# Patient Record
Sex: FEMALE | Race: WHITE | NOT HISPANIC OR LATINO | Employment: OTHER | URBAN - METROPOLITAN AREA
[De-identification: names, ages, dates, MRNs, and addresses within clinical notes are randomized per-mention and may not be internally consistent; named-entity substitution may affect disease eponyms.]

---

## 2017-03-28 ENCOUNTER — OFFICE VISIT (OUTPATIENT)
Dept: URGENT CARE | Facility: URGENT CARE | Age: 67
End: 2017-03-28
Payer: COMMERCIAL

## 2017-03-28 ENCOUNTER — RADIANT APPOINTMENT (OUTPATIENT)
Dept: GENERAL RADIOLOGY | Facility: CLINIC | Age: 67
End: 2017-03-28
Attending: PHYSICIAN ASSISTANT
Payer: COMMERCIAL

## 2017-03-28 VITALS
OXYGEN SATURATION: 96 % | DIASTOLIC BLOOD PRESSURE: 90 MMHG | BODY MASS INDEX: 46.64 KG/M2 | WEIGHT: 263.2 LBS | SYSTOLIC BLOOD PRESSURE: 162 MMHG | HEIGHT: 63 IN | TEMPERATURE: 97.8 F | HEART RATE: 81 BPM

## 2017-03-28 DIAGNOSIS — M62.838 TRAPEZIUS MUSCLE SPASM: ICD-10-CM

## 2017-03-28 DIAGNOSIS — M54.2 CERVICALGIA: ICD-10-CM

## 2017-03-28 DIAGNOSIS — I10 ESSENTIAL HYPERTENSION: ICD-10-CM

## 2017-03-28 PROCEDURE — 99214 OFFICE O/P EST MOD 30 MIN: CPT | Performed by: PHYSICIAN ASSISTANT

## 2017-03-28 PROCEDURE — 72040 X-RAY EXAM NECK SPINE 2-3 VW: CPT

## 2017-03-28 RX ORDER — LISINOPRIL 20 MG/1
20 TABLET ORAL DAILY
Qty: 30 TABLET | Refills: 1 | Status: SHIPPED | OUTPATIENT
Start: 2017-03-28 | End: 2017-04-19

## 2017-03-28 RX ORDER — HYDROCODONE BITARTRATE AND ACETAMINOPHEN 5; 325 MG/1; MG/1
1-2 TABLET ORAL
Qty: 14 TABLET | Refills: 0 | Status: SHIPPED | OUTPATIENT
Start: 2017-03-28 | End: 2017-04-19

## 2017-03-28 RX ORDER — PREDNISONE 2.5 MG/1
2.5 TABLET ORAL DAILY
COMMUNITY
End: 2018-02-06

## 2017-03-28 RX ORDER — IBUPROFEN 200 MG
200 TABLET ORAL EVERY 4 HOURS PRN
COMMUNITY

## 2017-03-28 RX ORDER — METHOCARBAMOL 750 MG/1
750 TABLET, FILM COATED ORAL 3 TIMES DAILY PRN
Qty: 30 TABLET | Refills: 0 | Status: SHIPPED | OUTPATIENT
Start: 2017-03-28 | End: 2017-04-19

## 2017-03-28 RX ORDER — AMOXICILLIN 500 MG
CAPSULE ORAL DAILY
COMMUNITY
End: 2024-06-20

## 2017-03-28 NOTE — MR AVS SNAPSHOT
"              After Visit Summary   3/28/2017    Josefina Maldonado    MRN: [id number]           Patient Information     Date Of Birth          1950        Visit Information        Provider Department      3/28/2017 10:15 AM Shakir Crespo PA-C [facility]        Today's Diagnoses     Elevated systolic blood pressure    -  1    Cervicalgia        Trapezius muscle spasm        Essential hypertension           Follow-ups after your visit        Who to contact     If you have questions or need follow up information about today's clinic visit or your schedule please contact [facility] directly at [phone].  Normal or non-critical lab and imaging results will be communicated to you by [employer], letter or phone within 4 business days after the clinic has received the results. If you do not hear from us within 7 days, please contact the clinic through [employer] or phone. If you have a critical or abnormal lab result, we will notify you by phone as soon as possible.  Submit refill requests through [employer] or call your pharmacy and they will forward the refill request to us. Please allow 3 business days for your refill to be completed.          Additional Information About Your Visit        MyChart Information     [employer] lets you send messages to your doctor, view your test results, renew your prescriptions, schedule appointments and more. To sign up, go to [email or web address] . Click on \"Log in\" on the left side of the screen, which will take you to the Welcome page. Then click on \"Sign up Now\" on the right side of the page.     You will be asked to enter the access code listed below, as well as some personal information. Please follow the directions to create your username and password.     Your access code is: [email or web address]  Expires: 2017 12:22 PM     Your access code will  in 90 days. If you need help or a new code, please call your [place] " "clinic or [phone].        Care EveryWhere ID     This is your Care EveryWhere ID. This could be used by other organizations to access your [place] medical records  [id number]        Your Vitals Were     Pulse Temperature Height Last Period Pulse Oximetry BMI (Body Mass Index)    81 97.8  F (36.6  C) 5' 3\" (1.6 m) 04/11/2000 96% 46.62 kg/m2       Blood Pressure from Last 3 Encounters:   03/28/17 162/90   12/13/11 110/68   06/30/11 98/62    Weight from Last 3 Encounters:   03/28/17 263 lb 3.2 oz (119.4 kg)   12/13/11 257 lb 6.4 oz (116.8 kg)   06/30/11 259 lb 9.6 oz (117.8 kg)                 Today's Medication Changes          These changes are accurate as of: 3/28/17 12:22 PM.  If you have any questions, ask your nurse or doctor.               Start taking these medicines.        Dose/Directions    HYDROcodone-acetaminophen 5-325 MG per tablet   Commonly known as:  NORCO   Used for:  Cervicalgia, Trapezius muscle spasm   Started by:  Shakir Crespo PA-C        Dose:  1-2 tablet   Take 1-2 tablets by mouth nightly as needed   Quantity:  14 tablet   Refills:  0       methocarbamol 750 MG tablet   Commonly known as:  ROBAXIN   Used for:  Trapezius muscle spasm   Started by:  Shakir Crespo PA-C        Dose:  750 mg   Take 1 tablet (750 mg) by mouth 3 times daily as needed for muscle spasms   Quantity:  30 tablet   Refills:  0         These medicines have changed or have updated prescriptions.        Dose/Directions    predniSONE 2.5 MG tablet   Commonly known as:  DELTASONE   This may have changed:  Another medication with the same name was removed. Continue taking this medication, and follow the directions you see here.   Changed by:  Shakir Crespo PA-C        Dose:  2.5 mg   Take 2.5 mg by mouth daily   Refills:  0            Where to get your medicines      These medications were sent to [facility] [address] [place] [address]  [address] [facility] [address]     " Phone:  [phone]     lisinopril 20 MG tablet    methocarbamol 750 MG tablet         Some of these will need a paper prescription and others can be bought over the counter.  Ask your nurse if you have questions.     Bring a paper prescription for each of these medications     HYDROcodone-acetaminophen 5-325 MG per tablet                Primary Care Provider Office Phone # Fax #    Roshan Troy [phone]       [facility] [address] [facility] [address]        Thank you!     Thank you for choosing [facility]  for your care. Our goal is always to provide you with excellent care. Hearing back from our patients is one way we can continue to improve our services. Please take a few minutes to complete the written survey that you may receive in the mail after your visit with us. Thank you!             Your Updated Medication List - Protect others around you: Learn how to safely use, store and throw away your medicines at www.disposemymeds.org.          This list is accurate as of: 3/28/17 12:22 PM.  Always use your most recent med list.                   Brand Name Dispense Instructions for use    5-HTP PO      Reported on 3/28/2017       ASPIRIN PO      Take 81 mg by mouth       calcium carbonate-Vit D-Min 600-400 MG-UNIT per tablet     100 tablet    Take 1 tablet by mouth daily. FOR BONE HEALTH       FISH OIL PO      Take  by mouth.       HYDROcodone-acetaminophen 5-325 MG per tablet    NORCO    14 tablet    Take 1-2 tablets by mouth nightly as needed       ibuprofen 200 MG tablet    ADVIL/MOTRIN     Take 200 mg by mouth every 4 hours as needed for mild pain       L-Tryptophan 500 MG Caps      Take by mouth daily       lisinopril 20 MG tablet    PRINIVIL    30 tablet    Take 1 tablet (20 mg) by mouth daily FOR HIGH BLOOD PRESSURE       MELATONIN PO      Take  by mouth.       methocarbamol 750 MG tablet    ROBAXIN    30 tablet    Take 1 tablet (750  mg) by mouth 3 times daily as needed for muscle spasms       MULTIVITAMIN PO      None Entered       OTEZLA PO      Take by mouth daily       predniSONE 2.5 MG tablet    DELTASONE     Take 2.5 mg by mouth daily       VITAMIN B-1 PO      Take  by mouth.       VITAMIN C PO      Take  by mouth.       VITAMIN E PO      Take  by mouth.

## 2017-03-28 NOTE — NURSING NOTE
"Chief Complaint   Patient presents with     Urgent Care     Pt slipped and fell on ice about mid February. Pt went to [facility]. Pt reports no broken bones. Pt had an MRI of her right shoulder which was clear. Pt went back to [facility] and they gave her a brace and sent her to PT. Pt id frustrated because now she is having neck pain na d her BP is has been high.       Initial /90  Pulse 81  Temp 97.8  F (36.6  C)  Ht 5' 3\" (1.6 m)  Wt 263 lb 3.2 oz (119.4 kg)  LMP 04/11/2000  SpO2 96%  BMI 46.62 kg/m2 Estimated body mass index is 46.62 kg/(m^2) as calculated from the following:    Height as of this encounter: 5' 3\" (1.6 m).    Weight as of this encounter: 263 lb 3.2 oz (119.4 kg).  Medication Reconciliation: complete    "

## 2017-03-28 NOTE — PROGRESS NOTES
SUBJECTIVE:  Chief Complaint   Patient presents with     Urgent Care     Pt slipped and fell on ice about mid February. Pt went to [facility]. Pt reports no broken bones. Pt had an MRI of her right shoulder which was clear. Pt went back to [facility] and they gave her a brace and sent her to PT. Pt id frustrated because now she is having neck pain na d her BP is has been high.     Josefina Maldonado is a 66 year old female presents with a chief complaint of upper back, tenderness, spasms and tightness.  The injury occurred weeks  ago.   How: lifting, twisted, turning/bendingimmediate pain.  The patient complained of moderate pain  and has had decreased ROM.  Pain exacerbated by movement.  Relieved by nothing.  She treated it initially with ice and heat. This is not the first time this type of injury has occurred to this patient.     Past Medical History:   Diagnosis Date     Chronic depressive personality disorder 1966    has a therapist     Degenerative disc disease      Glucose intolerance      Hypersomnia with sleep apnea, unspecified 2001    uses cpap     Hypertension      Obesity      Psoriatic arthritis (H)      Rheumatic fever without mention of heart involvement 1950     Allergies   Allergen Reactions     Latex      rash     Social History   Substance Use Topics     Smoking status: Current Every Day Smoker     Packs/day: 1.00     Years: 40.00     Smokeless tobacco: Not on file      Comment: 5-6 cigarettes per day     Alcohol use No       ROS:  CONSTITUTIONAL:NEGATIVE for fever, chills, change in weight  INTEGUMENTARY/SKIN: NEGATIVE for worrisome rashes, moles or lesions  ENT/MOUTH: NEGATIVE for ear, mouth and throat problems  RESP:NEGATIVE for significant cough or SOB  CV: NEGATIVE for chest pain, palpitations or peripheral edema  GI: NEGATIVE for nausea, abdominal pain, heartburn, or change in bowel habits  MUSCULOSKELETAL: POSITIVE  for upper back pain, spasms and DROM  NEURO: NEGATIVE for weakness, dizziness or  "paresthesias    EXAM:   /90  Pulse 81  Temp 97.8  F (36.6  C)  Ht 5' 3\" (1.6 m)  Wt 263 lb 3.2 oz (119.4 kg)  LMP 04/11/2000  SpO2 96%  BMI 46.62 kg/m2  Gen: healthy,alert,no distress  Extremity: DROM of right upper arm.   There is not compromise to the distal circulation.  Pulses are +2 and CRT is brisk  GENERAL APPEARANCE: healthy, alert and no distress  NECK: supple, non-tender to palpation, FROM   CHEST: clear to auscultation  CV: regular rate and rhythm  EXTREMITIES: peripheral pulses normal  MS:  tender to palpation right side  SKIN: no suspicious lesions or rashes  NEURO: Normal strength and tone, sensory exam grossly normal, mentation intact and speech normal    X-RAY was done and negative for acute changes including fracture Xray read by Shakir Crespo at time of visit    ASSESSMENT/PLAN:      ICD-10-CM    1. Elevated systolic blood pressure I10    2. Cervicalgia M54.2 XR Cervical Spine 2/3 Views     HYDROcodone-acetaminophen (NORCO) 5-325 MG per tablet   3. Trapezius muscle spasm M62.838 HYDROcodone-acetaminophen (NORCO) 5-325 MG per tablet     methocarbamol (ROBAXIN) 750 MG tablet   4. Essential hypertension I10 lisinopril (PRINIVIL) 20 MG tablet       Follow up with PCP  May need PT    "

## 2017-03-28 NOTE — LETTER
[facility] [place] [facility]  [address] [id number] [phone]      March 28, 2017    RE:  Josefina Maldonado                                                                                                                                                       [address]  [facility] [address]            To whom it may concern:    Josefina Maldonado was seen in the urgent care today.        Sincerely,        Shakir Crespo [facility]

## 2017-03-30 ENCOUNTER — TELEPHONE (OUTPATIENT)
Dept: INTERNAL MEDICINE | Facility: CLINIC | Age: 67
End: 2017-03-30

## 2017-03-30 NOTE — TELEPHONE ENCOUNTER
Pt was started on lisinopril (PRINIVIL) 20 MG tablet for high blood pressure. Her last dose was today at 1:30pm (she took 20mg tablet).  Her BP readings have been:  174/102 , 167/98, and 162/95.   is wondering if pt should take another BP pill tonight to help bring down her BP.   Pt denies lightheadedness, dizziness, no chest pain, no SOB, no weakness.  Appt scheduled with Dr. Goodson tomorrow at 11am.  advised  to bring pt in sooner if she has any lightheadedness dizziness, chest pain SOB.  Patient stated understanding, and agreed to plan of care.

## 2017-03-30 NOTE — TELEPHONE ENCOUNTER
I don't necessarily think the pt needs to be seen soley based on some elevated BP #  I will defer to Dr. Troy in regards to medical mgmt and appropriate f/u.  She may want to fit the pt into her schedule as I would prefer to leave my same day spots open for truly acute same day needs

## 2017-03-31 ENCOUNTER — OFFICE VISIT (OUTPATIENT)
Dept: INTERNAL MEDICINE | Facility: CLINIC | Age: 67
End: 2017-03-31
Payer: COMMERCIAL

## 2017-03-31 VITALS
WEIGHT: 267.6 LBS | HEART RATE: 102 BPM | HEIGHT: 63 IN | BODY MASS INDEX: 47.41 KG/M2 | DIASTOLIC BLOOD PRESSURE: 90 MMHG | OXYGEN SATURATION: 96 % | SYSTOLIC BLOOD PRESSURE: 150 MMHG | TEMPERATURE: 98.1 F

## 2017-03-31 DIAGNOSIS — G47.10 HYPERSOMNIA WITH SLEEP APNEA: ICD-10-CM

## 2017-03-31 DIAGNOSIS — G47.30 HYPERSOMNIA WITH SLEEP APNEA: ICD-10-CM

## 2017-03-31 DIAGNOSIS — I10 ESSENTIAL HYPERTENSION: Primary | ICD-10-CM

## 2017-03-31 DIAGNOSIS — L40.50 PSORIATIC ARTHRITIS (H): ICD-10-CM

## 2017-03-31 LAB
ANION GAP SERPL CALCULATED.3IONS-SCNC: 7 MMOL/L (ref 3–14)
BUN SERPL-MCNC: 14 MG/DL (ref 7–30)
CALCIUM SERPL-MCNC: 9 MG/DL (ref 8.5–10.1)
CHLORIDE SERPL-SCNC: 105 MMOL/L (ref 94–109)
CO2 SERPL-SCNC: 29 MMOL/L (ref 20–32)
CREAT SERPL-MCNC: 0.62 MG/DL (ref 0.52–1.04)
GFR SERPL CREATININE-BSD FRML MDRD: ABNORMAL ML/MIN/1.7M2
GLUCOSE SERPL-MCNC: 177 MG/DL (ref 70–99)
POTASSIUM SERPL-SCNC: 3.9 MMOL/L (ref 3.4–5.3)
SODIUM SERPL-SCNC: 141 MMOL/L (ref 133–144)

## 2017-03-31 PROCEDURE — 36415 COLL VENOUS BLD VENIPUNCTURE: CPT | Performed by: INTERNAL MEDICINE

## 2017-03-31 PROCEDURE — 80048 BASIC METABOLIC PNL TOTAL CA: CPT | Performed by: INTERNAL MEDICINE

## 2017-03-31 PROCEDURE — 99214 OFFICE O/P EST MOD 30 MIN: CPT | Performed by: INTERNAL MEDICINE

## 2017-03-31 RX ORDER — HYDROCHLOROTHIAZIDE 12.5 MG/1
12.5 TABLET ORAL DAILY
Qty: 30 TABLET | Refills: 1 | Status: SHIPPED | OUTPATIENT
Start: 2017-03-31 | End: 2017-04-19

## 2017-03-31 NOTE — LETTER
[facility]  [address]  [address]  [phone]        Josefina Maldonado  [address]  [facility] [address]      4/5/2017        Dear Ms. Josefina Maldonado:    I am writing to inform you of the results of the laboratory tests you had done recently.  Results for orders placed or performed in visit on 03/31/17   Basic metabolic panel   Result Value Ref Range    Sodium 141 133 - 144 mmol/L    Potassium 3.9 3.4 - 5.3 mmol/L    Chloride 105 94 - 109 mmol/L    Carbon Dioxide 29 20 - 32 mmol/L    Anion Gap 7 3 - 14 mmol/L    Glucose 177 (H) 70 - 99 mg/dL    Urea Nitrogen 14 7 - 30 mg/dL    Creatinine 0.62 0.52 - 1.04 mg/dL    GFR Estimate >90  Non  GFR Calc   >60 mL/min/1.7m2    GFR Estimate If Black >90   GFR Calc   >60 mL/min/1.7m2    Calcium 9.0 8.5 - 10.1 mg/dL      The lab is non-fasting so the glucose is actually not abnormal.  It would be a good idea to get a fasting specimen though.  We can arrange this at your follow up.    Thank you for allowing me to participate in your care. If you have any further questions or problems, please contact me via our nurse line at [phone].    Sincerely,          Virgil Goodson MD  Department of Internal Medicine  [facility]

## 2017-03-31 NOTE — PROGRESS NOTES
"  SUBJECTIVE:                                                    Josefina Maldonado is a 66 year old female who presents to clinic today for the following health issues:    She was last seen here in 2011 but has regular f/u with her rheumatologist.  She became aware recently that her BP was higher than it had been previously for some time. Yrs ago she states she had taken anti-HTN meds but it was a \"long time ago\".    Earlier this week she was seen in the [facility] and started on 20 mg of lisinopril due to her elevations.    Hypertension Follow-up  BP Readings from Last 3 Encounters:   03/31/17 150/90   03/28/17 162/90   12/13/11 110/68       Outpatient blood pressures are being checked at home.  Results are 140-180s to 80s-110s.    Low Salt Diet: no added salt       Amount of exercise or physical activity: 1 day/week for an average of 45-60 minutes    Problems taking medications regularly: No    Medication side effects: none    Diet: regular (no restrictions)    Problem list and histories reviewed & adjusted, as indicated.  Additional history: as documented    Labs reviewed in EPIC    Reviewed and updated as needed this visit by clinical staff  Tobacco  Allergies       Reviewed and updated as needed this visit by Provider         ROS:  Constitutional, neuro, ENT, endocrine, pulmonary, cardiac, gastrointestinal, genitourinary, musculoskeletal, integument and psychiatric systems are negative, except as otherwise noted.    OBJECTIVE:                                                    /90 (BP Location: Left arm, Patient Position: Chair, Cuff Size: Adult Large)  Pulse 102  Temp 98.1  F (36.7  C) (Oral)  Ht 5' 3\" (1.6 m)  Wt 267 lb 9.6 oz (121.4 kg)  LMP 04/11/2000  SpO2 96%  BMI 47.4 kg/m2  Body mass index is 47.4 kg/(m^2).  GENERAL APPEARANCE: alert, no distress and over weight  HENT: nose and mouth without ulcers or lesions  NECK: no adenopathy, no asymmetry, masses, or scars and thyroid normal to " palpation  RESP: lungs clear to auscultation - no rales, rhonchi or wheezes  CV: regular rates and rhythm, normal S1 S2, no S3 or S4 and no murmur, click or rub  MS: extremities normal- no gross deformities noted  SKIN: no suspicious lesions or rashes  NEURO: Normal strength and tone, mentation intact and speech normal  PSYCH: mentation appears normal and affect normal/bright    Diagnostic test results:  Results for orders placed or performed in visit on 03/31/17 (from the past 24 hour(s))   Basic metabolic panel   Result Value Ref Range    Sodium 141 133 - 144 mmol/L    Potassium 3.9 3.4 - 5.3 mmol/L    Chloride 105 94 - 109 mmol/L    Carbon Dioxide 29 20 - 32 mmol/L    Anion Gap 7 3 - 14 mmol/L    Glucose 177 (H) 70 - 99 mg/dL    Urea Nitrogen 14 7 - 30 mg/dL    Creatinine 0.62 0.52 - 1.04 mg/dL    GFR Estimate >90  Non  GFR Calc   >60 mL/min/1.7m2    GFR Estimate If Black >90   GFR Calc   >60 mL/min/1.7m2    Calcium 9.0 8.5 - 10.1 mg/dL        ASSESSMENT/PLAN:                                                    1. Essential hypertension  Add hctz  F/u next 3-4 wks  - hydrochlorothiazide 12.5 MG TABS tablet; Take 1 tablet (12.5 mg) by mouth daily  Dispense: 30 tablet; Refill: 1  - Basic metabolic panel    2. Psoriatic arthritis (H)  Per rheum    3. Hypersomnia with sleep apnea  Could be contributing. Check with sleep specialist       Follow up with Provider - 3-4 wks     Virgil Goodson MD  [facility]

## 2017-03-31 NOTE — NURSING NOTE
"Chief Complaint   Patient presents with     Hypertension     recheck meds        Initial /90 (BP Location: Left arm, Patient Position: Chair, Cuff Size: Adult Large)  Pulse 102  Temp 98.1  F (36.7  C) (Oral)  Ht 5' 3\" (1.6 m)  Wt 267 lb 9.6 oz (121.4 kg)  LMP 04/11/2000  SpO2 96%  BMI 47.4 kg/m2 Estimated body mass index is 47.4 kg/(m^2) as calculated from the following:    Height as of this encounter: 5' 3\" (1.6 m).    Weight as of this encounter: 267 lb 9.6 oz (121.4 kg).  Medication Reconciliation: complete    "

## 2017-03-31 NOTE — MR AVS SNAPSHOT
"              After Visit Summary   3/31/2017    Josefina Maldonado    MRN: [id number]           Patient Information     Date Of Birth          1950        Visit Information        Provider Department      3/31/2017 11:00 AM Virgil Goodson MD [facility]        Today's Diagnoses     Essential hypertension    -  1       Follow-ups after your visit        Who to contact     If you have questions or need follow up information about today's clinic visit or your schedule please contact [facility] directly at [phone].  Normal or non-critical lab and imaging results will be communicated to you by [employer], letter or phone within 4 business days after the clinic has received the results. If you do not hear from us within 7 days, please contact the clinic through [employer] or phone. If you have a critical or abnormal lab result, we will notify you by phone as soon as possible.  Submit refill requests through [employer] or call your pharmacy and they will forward the refill request to us. Please allow 3 business days for your refill to be completed.          Additional Information About Your Visit        MyChart Information     [employer] lets you send messages to your doctor, view your test results, renew your prescriptions, schedule appointments and more. To sign up, go to [email or web address] . Click on \"Log in\" on the left side of the screen, which will take you to the Welcome page. Then click on \"Sign up Now\" on the right side of the page.     You will be asked to enter the access code listed below, as well as some personal information. Please follow the directions to create your username and password.     Your access code is: [email or web address]  Expires: 2017 12:22 PM     Your access code will  in 90 days. If you need help or a new code, please call your [facility] or [phone].        Care EveryWhere ID     This is your Care EveryWhere ID. This could " "be used by other organizations to access your [place] medical records  [id number]        Your Vitals Were     Pulse Temperature Height Last Period Pulse Oximetry BMI (Body Mass Index)    102 98.1  F (36.7  C) (Oral) 5' 3\" (1.6 m) 04/11/2000 96% 47.4 kg/m2       Blood Pressure from Last 3 Encounters:   03/31/17 150/90   03/28/17 162/90   12/13/11 110/68    Weight from Last 3 Encounters:   03/31/17 267 lb 9.6 oz (121.4 kg)   03/28/17 263 lb 3.2 oz (119.4 kg)   12/13/11 257 lb 6.4 oz (116.8 kg)              We Performed the Following     Basic metabolic panel          Today's Medication Changes          These changes are accurate as of: 3/31/17 11:31 AM.  If you have any questions, ask your nurse or doctor.               Start taking these medicines.        Dose/Directions    hydrochlorothiazide 12.5 MG Tabs tablet   Used for:  Essential hypertension   Started by:  Virgil Goodson MD        Dose:  12.5 mg   Take 1 tablet (12.5 mg) by mouth daily   Quantity:  30 tablet   Refills:  1            Where to get your medicines      These medications were sent to [facility] [address] [place] [address]     Phone:  [phone]     hydrochlorothiazide 12.5 MG Tabs tablet                Primary Care Provider Office Phone # Fax #    Carolcassie Troy [phone]       [facility] [address] [facility] [address]        Thank you!     Thank you for choosing [facility]  for your care. Our goal is always to provide you with excellent care. Hearing back from our patients is one way we can continue to improve our services. Please take a few minutes to complete the written survey that you may receive in the mail after your visit with us. Thank you!             Your Updated Medication List - Protect others around you: Learn how to safely use, store and throw away your medicines at www.disposemymeds.org. "          This list is accurate as of: 3/31/17 11:31 AM.  Always use your most recent med list.                   Brand Name Dispense Instructions for use    5-HTP PO      Reported on 3/31/2017       ASPIRIN PO      Take 81 mg by mouth       calcium carbonate-Vit D-Min 600-400 MG-UNIT per tablet     100 tablet    Take 1 tablet by mouth daily. FOR BONE HEALTH       FISH OIL PO      Take  by mouth.       hydrochlorothiazide 12.5 MG Tabs tablet     30 tablet    Take 1 tablet (12.5 mg) by mouth daily       HYDROcodone-acetaminophen 5-325 MG per tablet    NORCO    14 tablet    Take 1-2 tablets by mouth nightly as needed       ibuprofen 200 MG tablet    ADVIL/MOTRIN     Take 200 mg by mouth every 4 hours as needed for mild pain       L-Tryptophan 500 MG Caps      Take by mouth daily       lisinopril 20 MG tablet    PRINIVIL    30 tablet    Take 1 tablet (20 mg) by mouth daily FOR HIGH BLOOD PRESSURE       MELATONIN PO      Take  by mouth.       methocarbamol 750 MG tablet    ROBAXIN    30 tablet    Take 1 tablet (750 mg) by mouth 3 times daily as needed for muscle spasms       MULTIVITAMIN PO      None Entered       OTEZLA PO      Take by mouth daily       predniSONE 2.5 MG tablet    DELTASONE     Take 2.5 mg by mouth daily       VITAMIN B-1 PO      Take  by mouth.       VITAMIN C PO      Take  by mouth.       VITAMIN E PO      Take  by mouth.

## 2017-04-04 ENCOUNTER — TRANSFERRED RECORDS (OUTPATIENT)
Dept: HEALTH INFORMATION MANAGEMENT | Facility: CLINIC | Age: 67
End: 2017-04-04

## 2017-04-04 LAB
ALT SERPL-CCNC: 32 IU/L (ref 5–35)
AST SERPL-CCNC: 23 U/L (ref 5–34)
CREAT SERPL-MCNC: 0.77 MG/DL (ref 0.5–1.3)
GFR SERPL CREATININE-BSD FRML MDRD: 79.2 ML/MIN/1.73M2

## 2017-04-19 ENCOUNTER — OFFICE VISIT (OUTPATIENT)
Dept: INTERNAL MEDICINE | Facility: CLINIC | Age: 67
End: 2017-04-19
Payer: COMMERCIAL

## 2017-04-19 VITALS
BODY MASS INDEX: 46.38 KG/M2 | DIASTOLIC BLOOD PRESSURE: 72 MMHG | HEART RATE: 98 BPM | WEIGHT: 261.8 LBS | SYSTOLIC BLOOD PRESSURE: 118 MMHG | OXYGEN SATURATION: 95 % | TEMPERATURE: 98.3 F

## 2017-04-19 DIAGNOSIS — Z72.0 TOBACCO ABUSE: ICD-10-CM

## 2017-04-19 DIAGNOSIS — Z13.6 CARDIOVASCULAR SCREENING; LDL GOAL LESS THAN 130: ICD-10-CM

## 2017-04-19 DIAGNOSIS — E11.65 TYPE 2 DIABETES MELLITUS WITH HYPERGLYCEMIA, WITHOUT LONG-TERM CURRENT USE OF INSULIN (H): Primary | ICD-10-CM

## 2017-04-19 DIAGNOSIS — I10 ESSENTIAL HYPERTENSION: ICD-10-CM

## 2017-04-19 DIAGNOSIS — R73.9 HYPERGLYCEMIA: ICD-10-CM

## 2017-04-19 LAB
ANION GAP SERPL CALCULATED.3IONS-SCNC: 9 MMOL/L (ref 3–14)
BUN SERPL-MCNC: 21 MG/DL (ref 7–30)
CALCIUM SERPL-MCNC: 10.2 MG/DL (ref 8.5–10.1)
CHLORIDE SERPL-SCNC: 102 MMOL/L (ref 94–109)
CHOLEST SERPL-MCNC: 174 MG/DL
CO2 SERPL-SCNC: 27 MMOL/L (ref 20–32)
CREAT SERPL-MCNC: 0.76 MG/DL (ref 0.52–1.04)
GFR SERPL CREATININE-BSD FRML MDRD: 75 ML/MIN/1.7M2
GLUCOSE SERPL-MCNC: 183 MG/DL (ref 70–99)
HBA1C MFR BLD: 7.4 % (ref 4.3–6)
HDLC SERPL-MCNC: 48 MG/DL
LDLC SERPL CALC-MCNC: 94 MG/DL
NONHDLC SERPL-MCNC: 126 MG/DL
POTASSIUM SERPL-SCNC: 3.8 MMOL/L (ref 3.4–5.3)
SODIUM SERPL-SCNC: 138 MMOL/L (ref 133–144)
TRIGL SERPL-MCNC: 161 MG/DL

## 2017-04-19 PROCEDURE — 83036 HEMOGLOBIN GLYCOSYLATED A1C: CPT | Performed by: INTERNAL MEDICINE

## 2017-04-19 PROCEDURE — 36415 COLL VENOUS BLD VENIPUNCTURE: CPT | Performed by: INTERNAL MEDICINE

## 2017-04-19 PROCEDURE — 80061 LIPID PANEL: CPT | Performed by: INTERNAL MEDICINE

## 2017-04-19 PROCEDURE — 99214 OFFICE O/P EST MOD 30 MIN: CPT | Performed by: INTERNAL MEDICINE

## 2017-04-19 PROCEDURE — 80048 BASIC METABOLIC PNL TOTAL CA: CPT | Performed by: INTERNAL MEDICINE

## 2017-04-19 RX ORDER — LISINOPRIL AND HYDROCHLOROTHIAZIDE 12.5; 2 MG/1; MG/1
1 TABLET ORAL DAILY
Qty: 30 TABLET | Refills: 5 | Status: SHIPPED | OUTPATIENT
Start: 2017-04-19 | End: 2017-06-29

## 2017-04-19 NOTE — MR AVS SNAPSHOT
"              After Visit Summary   2017    Josefina Maldonado    MRN: [id number]           Patient Information     Date Of Birth          1950        Visit Information        Provider Department      2017 9:00 AM Virgil Goodson MD [facility]        Today's Diagnoses     Hyperglycemia    -  1    Essential hypertension        CARDIOVASCULAR SCREENING; LDL GOAL LESS THAN 130           Follow-ups after your visit        Who to contact     If you have questions or need follow up information about today's clinic visit or your schedule please contact [facility] directly at [phone].  Normal or non-critical lab and imaging results will be communicated to you by MyChart, letter or phone within 4 business days after the clinic has received the results. If you do not hear from us within 7 days, please contact the clinic through [employer] or phone. If you have a critical or abnormal lab result, we will notify you by phone as soon as possible.  Submit refill requests through [employer] or call your pharmacy and they will forward the refill request to us. Please allow 3 business days for your refill to be completed.          Additional Information About Your Visit        MyChart Information     [employer] lets you send messages to your doctor, view your test results, renew your prescriptions, schedule appointments and more. To sign up, go to [email or web address] . Click on \"Log in\" on the left side of the screen, which will take you to the Welcome page. Then click on \"Sign up Now\" on the right side of the page.     You will be asked to enter the access code listed below, as well as some personal information. Please follow the directions to create your username and password.     Your access code is: [email or web address]  Expires: 2017 12:22 PM     Your access code will  in 90 days. If you need help or a new code, please call your [facility] or " [phone].        Care EveryWhere ID     This is your Care EveryWhere ID. This could be used by other organizations to access your [place] medical records  [id number]        Your Vitals Were     Pulse Temperature Last Period Pulse Oximetry BMI (Body Mass Index)       98 98.3  F (36.8  C) (Oral) 04/11/2000 95% 46.38 kg/m2        Blood Pressure from Last 3 Encounters:   04/19/17 118/72   03/31/17 150/90   03/28/17 162/90    Weight from Last 3 Encounters:   04/19/17 261 lb 12.8 oz (118.8 kg)   03/31/17 267 lb 9.6 oz (121.4 kg)   03/28/17 263 lb 3.2 oz (119.4 kg)              We Performed the Following     Basic metabolic panel     Hemoglobin A1c     Lipid Profile with reflex to direct LDL          Today's Medication Changes          These changes are accurate as of: 4/19/17 10:00 AM.  If you have any questions, ask your nurse or doctor.               Start taking these medicines.        Dose/Directions    lisinopril-hydrochlorothiazide 20-12.5 MG per tablet   Commonly known as:  PRINZIDE/ZESTORETIC   Used for:  Essential hypertension   Replaces:  lisinopril 20 MG tablet   Started by:  Virgil Goodson MD        Dose:  1 tablet   Take 1 tablet by mouth daily   Quantity:  30 tablet   Refills:  5         Stop taking these medicines if you haven't already. Please contact your care team if you have questions.     hydrochlorothiazide 12.5 MG Tabs tablet   Stopped by:  Virgil Goodson MD           lisinopril 20 MG tablet   Commonly known as:  PRINIVIL   Replaced by:  lisinopril-hydrochlorothiazide 20-12.5 MG per tablet   Stopped by:  Virgil Goodson MD                Where to get your medicines      These medications were sent to [facility] [address] [place] [id number]     Phone:  [phone]     lisinopril-hydrochlorothiazide 20-12.5 MG per tablet                Primary Care Provider Office Phone # Fax #    Roshan Troy MD  [phone]       [facility] [address] [facility] [address]        Thank you!     Thank you for choosing [facility]  for your care. Our goal is always to provide you with excellent care. Hearing back from our patients is one way we can continue to improve our services. Please take a few minutes to complete the written survey that you may receive in the mail after your visit with us. Thank you!             Your Updated Medication List - Protect others around you: Learn how to safely use, store and throw away your medicines at www.disposemymeds.org.          This list is accurate as of: 4/19/17 10:00 AM.  Always use your most recent med list.                   Brand Name Dispense Instructions for use    5-HTP PO      Reported on 3/31/2017       ASPIRIN PO      Take 81 mg by mouth       calcium carbonate-Vit D-Min 600-400 MG-UNIT per tablet     100 tablet    Take 1 tablet by mouth daily. FOR BONE HEALTH       FISH OIL PO      Take  by mouth.       ibuprofen 200 MG tablet    ADVIL/MOTRIN     Take 200 mg by mouth every 4 hours as needed for mild pain Reported on 4/19/2017       L-Tryptophan 500 MG Caps      Take by mouth daily       lisinopril-hydrochlorothiazide 20-12.5 MG per tablet    PRINZIDE/ZESTORETIC    30 tablet    Take 1 tablet by mouth daily       MELATONIN PO      Take  by mouth.       MULTIVITAMIN PO      None Entered       OTEZLA PO      Take by mouth daily       predniSONE 2.5 MG tablet    DELTASONE     Take 2.5 mg by mouth daily       VITAMIN B-1 PO      Take  by mouth.       VITAMIN C PO      Take  by mouth.       VITAMIN E PO      Take  by mouth.

## 2017-04-19 NOTE — NURSING NOTE
"Chief Complaint   Patient presents with     Concerns about diabetes       Initial /72  Pulse 98  Temp 98.3  F (36.8  C) (Oral)  Wt 261 lb 12.8 oz (118.8 kg)  LMP 04/11/2000  SpO2 95%  BMI 46.38 kg/m2 Estimated body mass index is 46.38 kg/(m^2) as calculated from the following:    Height as of 3/31/17: 5' 3\" (1.6 m).    Weight as of this encounter: 261 lb 12.8 oz (118.8 kg).  Medication Reconciliation: complete    "

## 2017-04-19 NOTE — PROGRESS NOTES
SUBJECTIVE:                                                    Josefina Maldonado is a 66 year old female who presents to clinic today for the following health issues:    Hypertension Follow-up  - seen 1 mo ago and started on low dose HCTZ  -labs at that time revealed non-fasting glucose of 177    Outpatient blood pressures are not being checked.    Low Salt Diet: no added salt     blood sugar was taken at home and it was at 150 the other day. After a meal it was > 300 - both with her 's meter.     Problem list and histories reviewed & adjusted, as indicated.  Additional history: as documented    Labs reviewed in EPIC    Reviewed and updated as needed this visit by clinical staff  Allergies       Reviewed and updated as needed this visit by Provider       ROS:  Constitutional, HEENT, cardiovascular, pulmonary, gi and gu systems are negative, except as otherwise noted.    OBJECTIVE:                                                    /72  Pulse 98  Temp 98.3  F (36.8  C) (Oral)  Wt 261 lb 12.8 oz (118.8 kg)  LMP 04/11/2000  SpO2 95%  BMI 46.38 kg/m2  Body mass index is 46.38 kg/(m^2).  GENERAL APPEARANCE: alert, no distress and over weight  RESP: lungs clear to auscultation - no rales, rhonchi or wheezes  CV: regular rates and rhythm, normal S1 S2, no S3 or S4 and no murmur, click or rub    Diagnostic test results:  Results for orders placed or performed in visit on 04/19/17 (from the past 24 hour(s))   Hemoglobin A1c   Result Value Ref Range    Hemoglobin A1C 7.4 (H) 4.3 - 6.0 %   Basic metabolic panel   Result Value Ref Range    Sodium 138 133 - 144 mmol/L    Potassium 3.8 3.4 - 5.3 mmol/L    Chloride 102 94 - 109 mmol/L    Carbon Dioxide 27 20 - 32 mmol/L    Anion Gap 9 3 - 14 mmol/L    Glucose 183 (H) 70 - 99 mg/dL    Urea Nitrogen 21 7 - 30 mg/dL    Creatinine 0.76 0.52 - 1.04 mg/dL    GFR Estimate 75 >60 mL/min/1.7m2    GFR Estimate If Black >90   GFR Calc   >60 mL/min/1.7m2     Calcium 10.2 (H) 8.5 - 10.1 mg/dL   Lipid Profile with reflex to direct LDL   Result Value Ref Range    Cholesterol 174 <200 mg/dL    Triglycerides 161 (H) <150 mg/dL    HDL Cholesterol 48 (L) >49 mg/dL    LDL Cholesterol Calculated 94 <100 mg/dL    Non HDL Cholesterol 126 <130 mg/dL        ASSESSMENT/PLAN:                                                    1. Hyperglycemia  2. New onset Type 2 Diabetes  Labs are consistent with new onset T2DM  - she should discuss with her Rheumatologist whether there is anyway to reduce steroid dose  - CDE  - metformin  -start statin   -f/u here in 1 month   - Hemoglobin A1c  - Basic metabolic panel    2. Essential hypertension  Much better- continue medication   - Basic metabolic panel  - lisinopril-hydrochlorothiazide (PRINZIDE/ZESTORETIC) 20-12.5 MG per tablet; Take 1 tablet by mouth daily  Dispense: 30 tablet; Refill: 5    3. CARDIOVASCULAR SCREENING; LDL GOAL LESS THAN 130  - Lipid Profile with reflex to direct LDL  The 10-year ASCVD risk score (Wilmot ADOLFO Jr, et al., 2013) is: 26%    Values used to calculate the score:      Age: 66 years      Sex: Female      Is Non- : No      Diabetic: Yes      Tobacco smoker: Yes      Systolic Blood Pressure: 118 mmHg      Is BP treated: Yes      HDL Cholesterol: 48 mg/dL      Total Cholesterol: 174 mg/dL       Follow up with Provider - as above     Virgil Goodson MD  [facility]

## 2017-04-21 RX ORDER — BLOOD-GLUCOSE METER
EACH MISCELLANEOUS
Qty: 1 KIT | Refills: 0 | Status: SHIPPED | OUTPATIENT
Start: 2017-04-21 | End: 2018-06-22

## 2017-04-21 RX ORDER — ATORVASTATIN CALCIUM 20 MG/1
20 TABLET, FILM COATED ORAL AT BEDTIME
Qty: 30 TABLET | Refills: 5 | Status: SHIPPED | OUTPATIENT
Start: 2017-04-21 | End: 2017-07-10

## 2017-04-26 ENCOUNTER — MYC MEDICAL ADVICE (OUTPATIENT)
Dept: INTERNAL MEDICINE | Facility: CLINIC | Age: 67
End: 2017-04-26

## 2017-05-03 ENCOUNTER — ALLIED HEALTH/NURSE VISIT (OUTPATIENT)
Dept: EDUCATION SERVICES | Facility: CLINIC | Age: 67
End: 2017-05-03
Payer: COMMERCIAL

## 2017-05-03 VITALS — WEIGHT: 257.9 LBS | BODY MASS INDEX: 45.68 KG/M2

## 2017-05-03 DIAGNOSIS — E11.65 TYPE 2 DIABETES MELLITUS WITH HYPERGLYCEMIA, WITHOUT LONG-TERM CURRENT USE OF INSULIN (H): Primary | ICD-10-CM

## 2017-05-03 PROCEDURE — G0108 DIAB MANAGE TRN  PER INDIV: HCPCS

## 2017-05-03 NOTE — PROGRESS NOTES
Diabetes Self Management Training: Initial Assessment Visit for Newly Diagnosed Patients (Complete AADE Goals Flowsheet)    Josefina Maldonado presents today for education related to Type 2 diabetes.    She is accompanied by self    Patient's diabetes management related comments/concerns: started cuevas diet    Patient's emotional response to diabetes: concern for health and well-being    Patient would like this visit to be focused around the following diabetes-related behaviors and goals: Assistance with making lifestyle changes    ASSESSMENT:  Patient Problem List and Family Medical History reviewed for relevant medical history, current medical status, and diabetes risk factors.    Current Diabetes Management per Patient:  Taking diabetes medications?   no:   Leg cramping due to metformin and or statin.   Diabetes Medication(s)     Biguanides Sig    metFORMIN (GLUCOPHAGE) 500 MG tablet Take 1 tablet (500 mg) by mouth 2 times daily (with meals)          Past Diabetes Education: Newly diagnosed    Patient glucose self monitoring as follows: two times daily.   BG meter: One Touch Verio meter  BG results:       BG values are: elevated fasting blood glucose    Patient's most recent   Lab Results   Component Value Date    A1C 7.4 04/19/2017    is not meeting goal of <7.0   Wt Readings from Last 3 Encounters:   05/03/17 117 kg (257 lb 14.4 oz)   04/19/17 118.8 kg (261 lb 12.8 oz)   03/31/17 121.4 kg (267 lb 9.6 oz)       Nutrition:  Patient eats 3 meals per day  Cuevas diet. Fiber supplement.   Great observations about what she is eating and what it does to her blood sugar.   Significant diet changes.   Loves bread and candy.     Breakfast - Cuevas low cab shake.   Lunch - cheese, diet ginger ale.    Dinner - ham melts, low carb bread, slice of pineapple.    Snacks - nuts, cheese and crackers, or sandwich.     Beverages: diet pop. Black coffee. And water.     Cultural/Samaritan diet restrictions: No     Biggest Challenge to  "Healthy Eating: knowing what to eat    Physical Activity:    Limitations: Arthritis.   Might be able to walk with co-workers on lunch break.   Given diabetes activity program with [employer] handout. She will think about it,     Diabetes Risk Factors:  age over 45 years, overweight/obesity and inactivity    Diabetes Complications:  Discussed diabetes is a progressive disease and need to control glucose numbers.     Vitals:  Wt 117 kg (257 lb 14.4 oz)  LMP 04/11/2000  BMI 45.68 kg/m2  Estimated body mass index is 45.68 kg/(m^2) as calculated from the following:    Height as of 3/31/17: 1.6 m (5' 3\").    Weight as of this encounter: 117 kg (257 lb 14.4 oz).   Last 3 BP:   BP Readings from Last 3 Encounters:   04/19/17 118/72   03/31/17 150/90   03/28/17 162/90       History   Smoking Status     Current Every Day Smoker     Packs/day: 1.00     Years: 40.00   Smokeless Tobacco     Not on file     Comment: 5-6 cigarettes per day       Labs:  Lab Results   Component Value Date    A1C 7.4 04/19/2017     Lab Results   Component Value Date     04/19/2017     Lab Results   Component Value Date    LDL 94 04/19/2017     HDL Cholesterol   Date Value Ref Range Status   04/19/2017 48 (L) >49 mg/dL Final   ]  GFR Estimate   Date Value Ref Range Status   04/19/2017 75 >60 mL/min/1.7m2 Final     Comment:     Non  GFR Calc     GFR Estimate If Black   Date Value Ref Range Status   04/19/2017 >90   GFR Calc   >60 mL/min/1.7m2 Final     Lab Results   Component Value Date    CR 0.76 04/19/2017     No results found for: MICROALBUMIN    Socio/Economic Considerations:    Support system: spouse/significant other    Health Beliefs and Attitudes:   Patient Activation Measure Survey Score:  INNA Score (Last Two) 6/22/2011   INNA Raw Score 34   Activation Score 43.4   INNA Level 1       Stage of Change: ACTION (Actively working towards change)      Diabetes knowledge and skills assessment:     Patient is " knowledgeable in diabetes management concepts related to: none    Patient needs further education on the following diabetes management concepts: Healthy Eating, Being Active, Monitoring, Taking Medication, Problem Solving, Reducing Risks and Healthy Coping    Barriers to Learning Assessment: No Barriers identified    Based on learning assessment above, most appropriate setting for further diabetes education would be: Group class or Individual setting.    INTERVENTION:    has diet controlled diabetes. State they use a lot of supplements and vitamins. They are working on a list of what they are taking.  Supervisor at work has diabetes and has been giving her information.   They state they are taking vitamin D so unlikely to have low vitamin D causing muscle aches.     Education provided today on:  AADE Self-Care Behaviors:  Healthy Eating: carbohydrate counting, consistency in amount, composition, and timing of food intake, weight reduction, portion control and plate planning method  Being Active: relationship to blood glucose  Monitoring: individual blood glucose targets  Taking Medication: diabetes options if metformin does not work.    Opportunities for ongoing education and support in diabetes-self management were discussed.    Pt verbalized understanding of concepts discussed and recommendations provided today.       Education Materials Provided:  Philip Understanding Diabetes Booklet    PLAN:  See Patient Instructions for co-developed, patient-stated behavior change goals.  Recommend change to oral medication regimen - SGLT2 or Januvia?  AVS printed and provided to patient today.    FOLLOW-UP:  Follow-up appointment scheduled declined. Stated she was reluctant to come to this appointment but was glad she did.  Chart routed to referring provider.    Ongoing plan for education and support: Written resources (magazines, books, etc.) and Follow-up with primary care provider    Sharon Kwong RD, MUNDO,  CDE    Time Spent: 90 minutes  Encounter Type: Individual    Any diabetes medication dose changes were made via the CDE Protocol and Collaborative Practice Agreement with the patient's referring provider. A copy of this encounter was shared with the provider.

## 2017-05-03 NOTE — PATIENT INSTRUCTIONS
Blood sugar goals:  Fasting blood sugar 80- 125 mg/dl  2 hours after meals - less than 180 mg/dl. (or less than 160 mg/dl)    SGLT2 - Invokana and Jardinance. Do not absorb all sugars. Lowers blood pressure. Saul of bladder infection. Eliminates 300 calories a day. Possible wt loss. No weight gain. Pill.     Januvia - pill, does not cause a low blood sugar. Might be weight neutral. Sometimes more expensive.     Sulfurnea - glimipiride. Makes your pancreas work harder. Risk for low blood sugars. Maybe not the best to start with since you have great blood sugars in the afternoon. Possible weight gain.     GLP1 - injectable. Will not give you hypoglycemia. Can help with weight loss. Injectable.     30 grams carbohydrate minimum at a meal or 1/4th of your plate.    Diabetes Activity Program. Or check in with niece.   Free videos on-line.   Walk on commercials? Hoe could you be more active.

## 2017-05-03 NOTE — Clinical Note
MarianabishnuJosefina stopped taking her metformin and statin due to muscle aches, feeling of pins and needles, and GI symptoms. Discussed medication options instead of Metformin. What do you think about Jardinance or Invokana? Wants to lose weight. High fasting BG's. Good daytime blood sugars. She states her prescription can go to [facility] and needs to be in non-childproof containers due to her arthritis. Thank you!  Sharon Kwong RD, LD, CDE   I met with Josefina Maldonado for a diabetes education visit today. Ongoing plan for education and support: written materials. If there are any questions or recommended changes to the patient's diabetes care plan in the future, I will be in touch. Thank you for your referral!   (This communication is being sent to you in order to comply with our American Association of Diabetes Educators accreditation standards.)

## 2017-05-03 NOTE — MR AVS SNAPSHOT
After Visit Summary   5/3/2017    Josefina Maldonado    MRN: [id number]           Patient Information     Date Of Birth          1950        Visit Information        Provider Department      5/3/2017 1:00 PM  DIABETIC ED RESOURCE [facility]        Today's Diagnoses     Type 2 diabetes mellitus with hyperglycemia, without long-term current use of insulin (H)    -  1      Care Instructions    Blood sugar goals:  Fasting blood sugar 80- 125 mg/dl  2 hours after meals - less than 180 mg/dl. (or less than 160 mg/dl)    SGLT2 - Invokana and Jardinance. Do not absorb all sugars. Lowers blood pressure. Saul of bladder infection. Eliminates 300 calories a day. Possible wt loss. No weight gain. Pill.     Januvia - pill, does not cause a low blood sugar. Might be weight neutral. Sometimes more expensive.     Sulfurnea - glimipiride. Makes your pancreas work harder. Risk for low blood sugars. Maybe not the best to start with since you have great blood sugars in the afternoon. Possible weight gain.     GLP1 - injectable. Will not give you hypoglycemia. Can help with weight loss. Injectable.     30 grams carbohydrate minimum at a meal or 1/4th of your plate.    Diabetes Activity Program. Or check in with [employer]a.   Free videos on-line.   Walk on commercials? Hoe could you be more active.            Follow-ups after your visit        Who to contact     If you have questions or need follow up information about today's clinic visit or your schedule please contact [facility] directly at [phone].  Normal or non-critical lab and imaging results will be communicated to you by MyChart, letter or phone within 4 business days after the clinic has received the results. If you do not hear from us within 7 days, please contact the clinic through MyChart or phone. If you have a critical or abnormal lab result, we will notify you by phone as soon as possible.  Submit refill  requests through [employer] or call your pharmacy and they will forward the refill request to us. Please allow 3 business days for your refill to be completed.          Additional Information About Your Visit        [employer] Information     [employer] gives you secure access to your electronic health record. If you see a primary care provider, you can also send messages to your care team and make appointments. If you have questions, please call your primary care clinic.  If you do not have a primary care provider, please call [phone] and they will assist you.        Care EveryWhere ID     This is your Care EveryWhere ID. This could be used by other organizations to access your [place] medical records  [id number]        Your Vitals Were     Last Period BMI (Body Mass Index)                04/11/2000 45.68 kg/m2           Blood Pressure from Last 3 Encounters:   04/19/17 118/72   03/31/17 150/90   03/28/17 162/90    Weight from Last 3 Encounters:   05/03/17 117 kg (257 lb 14.4 oz)   04/19/17 118.8 kg (261 lb 12.8 oz)   03/31/17 121.4 kg (267 lb 9.6 oz)              Today, you had the following     No orders found for display       Primary Care Provider Office Phone # Fax #    Roshan Troy [phone]       [facility] [address] [facility] [address]        Thank you!     Thank you for choosing [facility]  for your care. Our goal is always to provide you with excellent care. Hearing back from our patients is one way we can continue to improve our services. Please take a few minutes to complete the written survey that you may receive in the mail after your visit with us. Thank you!             Your Updated Medication List - Protect others around you: Learn how to safely use, store and throw away your medicines at www.disposemymeds.org.          This list is accurate as of: 5/3/17  2:19 PM.  Always use your most recent med list.                   Brand Name  Dispense Instructions for use    5-HTP PO      Reported on 3/31/2017       ASPIRIN PO      Take 81 mg by mouth       atorvastatin 20 MG tablet    LIPITOR    30 tablet    Take 1 tablet (20 mg) by mouth At Bedtime       blood glucose monitoring lancets     1 Box    Use to test blood sugars 1 times daily       blood glucose monitoring meter device kit     1 kit    Use to test blood sugars 1x times daily       blood glucose monitoring test strip    ONE TOUCH VERIO IQ    100 strip    Use to test blood sugars 1x times daily       calcium carbonate-Vit D-Min 600-400 MG-UNIT per tablet     100 tablet    Take 1 tablet by mouth daily. FOR BONE HEALTH       FISH OIL PO      Take  by mouth.       ibuprofen 200 MG tablet    ADVIL/MOTRIN     Take 200 mg by mouth every 4 hours as needed for mild pain Reported on 4/19/2017       L-Tryptophan 500 MG Caps      Take by mouth daily       lisinopril-hydrochlorothiazide 20-12.5 MG per tablet    PRINZIDE/ZESTORETIC    30 tablet    Take 1 tablet by mouth daily       MELATONIN PO      Take  by mouth.       metFORMIN 500 MG tablet    GLUCOPHAGE    60 tablet    Take 1 tablet (500 mg) by mouth 2 times daily (with meals)       MULTIVITAMIN PO      None Entered       OTEZLA PO      Take by mouth daily       predniSONE 2.5 MG tablet    DELTASONE     Take 2.5 mg by mouth daily       VITAMIN B-1 PO      Take  by mouth.       VITAMIN C PO      Take  by mouth.       VITAMIN E PO      Take  by mouth.

## 2017-05-23 ENCOUNTER — TRANSFERRED RECORDS (OUTPATIENT)
Dept: HEALTH INFORMATION MANAGEMENT | Facility: CLINIC | Age: 67
End: 2017-05-23

## 2017-05-30 ENCOUNTER — OFFICE VISIT (OUTPATIENT)
Dept: INTERNAL MEDICINE | Facility: CLINIC | Age: 67
End: 2017-05-30
Payer: COMMERCIAL

## 2017-05-30 VITALS
HEART RATE: 95 BPM | WEIGHT: 251.2 LBS | SYSTOLIC BLOOD PRESSURE: 108 MMHG | OXYGEN SATURATION: 96 % | TEMPERATURE: 98.2 F | DIASTOLIC BLOOD PRESSURE: 68 MMHG | BODY MASS INDEX: 44.5 KG/M2

## 2017-05-30 DIAGNOSIS — E11.65 TYPE 2 DIABETES MELLITUS WITH HYPERGLYCEMIA, WITHOUT LONG-TERM CURRENT USE OF INSULIN (H): Primary | ICD-10-CM

## 2017-05-30 DIAGNOSIS — M79.2 NEUROPATHIC PAIN OF LOWER EXTREMITY, UNSPECIFIED LATERALITY: ICD-10-CM

## 2017-05-30 DIAGNOSIS — L40.50 PSORIATIC ARTHRITIS (H): ICD-10-CM

## 2017-05-30 DIAGNOSIS — I10 ESSENTIAL HYPERTENSION: ICD-10-CM

## 2017-05-30 LAB
ERYTHROCYTE [DISTWIDTH] IN BLOOD BY AUTOMATED COUNT: 14.6 % (ref 10–15)
ERYTHROCYTE [SEDIMENTATION RATE] IN BLOOD BY WESTERGREN METHOD: 22 MM/H (ref 0–30)
FOLATE SERPL-MCNC: 11 NG/ML
HCT VFR BLD AUTO: 47.2 % (ref 35–47)
HGB BLD-MCNC: 15.2 G/DL (ref 11.7–15.7)
MCH RBC QN AUTO: 31 PG (ref 26.5–33)
MCHC RBC AUTO-ENTMCNC: 32.2 G/DL (ref 31.5–36.5)
MCV RBC AUTO: 96 FL (ref 78–100)
PLATELET # BLD AUTO: 301 10E9/L (ref 150–450)
RBC # BLD AUTO: 4.9 10E12/L (ref 3.8–5.2)
T4 FREE SERPL-MCNC: 1.13 NG/DL (ref 0.76–1.46)
TSH SERPL DL<=0.005 MIU/L-ACNC: 0.25 MU/L (ref 0.4–4)
VIT B12 SERPL-MCNC: 1804 PG/ML (ref 193–986)
WBC # BLD AUTO: 12.3 10E9/L (ref 4–11)

## 2017-05-30 PROCEDURE — 85652 RBC SED RATE AUTOMATED: CPT | Performed by: INTERNAL MEDICINE

## 2017-05-30 PROCEDURE — 84443 ASSAY THYROID STIM HORMONE: CPT | Performed by: INTERNAL MEDICINE

## 2017-05-30 PROCEDURE — 99214 OFFICE O/P EST MOD 30 MIN: CPT | Performed by: INTERNAL MEDICINE

## 2017-05-30 PROCEDURE — 82746 ASSAY OF FOLIC ACID SERUM: CPT | Performed by: INTERNAL MEDICINE

## 2017-05-30 PROCEDURE — 82607 VITAMIN B-12: CPT | Performed by: INTERNAL MEDICINE

## 2017-05-30 PROCEDURE — 85027 COMPLETE CBC AUTOMATED: CPT | Performed by: INTERNAL MEDICINE

## 2017-05-30 PROCEDURE — 84439 ASSAY OF FREE THYROXINE: CPT | Performed by: INTERNAL MEDICINE

## 2017-05-30 PROCEDURE — 36415 COLL VENOUS BLD VENIPUNCTURE: CPT | Performed by: INTERNAL MEDICINE

## 2017-05-30 NOTE — MR AVS SNAPSHOT
After Visit Summary   5/30/2017    Josefina Maldonado    MRN: [id number]           Patient Information     Date Of Birth          1950        Visit Information        Provider Department      5/30/2017 2:20 PM Virgil Goodson MD [facility]        Today's Diagnoses     Type 2 diabetes mellitus with hyperglycemia, without long-term current use of insulin (H)    -  1    Neuropathic pain of lower extremity, unspecified laterality        Essential hypertension        Psoriatic arthritis (H)           Follow-ups after your visit        Who to contact     If you have questions or need follow up information about today's clinic visit or your schedule please contact [facility] directly at [phone].  Normal or non-critical lab and imaging results will be communicated to you by MyChart, letter or phone within 4 business days after the clinic has received the results. If you do not hear from us within 7 days, please contact the clinic through "[employer]hart or phone. If you have a critical or abnormal lab result, we will notify you by phone as soon as possible.  Submit refill requests through [employer] or call your pharmacy and they will forward the refill request to us. Please allow 3 business days for your refill to be completed.          Additional Information About Your Visit        MyChart Information     [employer] gives you secure access to your electronic health record. If you see a primary care provider, you can also send messages to your care team and make appointments. If you have questions, please call your primary care clinic.  If you do not have a primary care provider, please call [phone] and they will assist you.        Care EveryWhere ID     This is your Care EveryWhere ID. This could be used by other organizations to access your [facility] medical records  [id number]        Your Vitals Were     Pulse Temperature Last Period Pulse Oximetry BMI  (Body Mass Index)       95 98.2  F (36.8  C) (Oral) 04/11/2000 96% 44.5 kg/m2        Blood Pressure from Last 3 Encounters:   05/30/17 108/68   04/19/17 118/72   03/31/17 150/90    Weight from Last 3 Encounters:   05/30/17 251 lb 3.2 oz (113.9 kg)   05/03/17 257 lb 14.4 oz (117 kg)   04/19/17 261 lb 12.8 oz (118.8 kg)              We Performed the Following     CBC with platelets     ESR: Erythrocyte sedimentation rate     Folate     TSH with free T4 reflex     Vitamin B12        Primary Care Provider Office Phone # Fax #    Virgil Goodson [phone]       [facility] [address] [place] [id number]        Thank you!     Thank you for choosing [facility]  for your care. Our goal is always to provide you with excellent care. Hearing back from our patients is one way we can continue to improve our services. Please take a few minutes to complete the written survey that you may receive in the mail after your visit with us. Thank you!             Your Updated Medication List - Protect others around you: Learn how to safely use, store and throw away your medicines at www.disposemymeds.org.          This list is accurate as of: 5/30/17  3:13 PM.  Always use your most recent med list.                   Brand Name Dispense Instructions for use    5-HTP PO      Reported on 3/31/2017       ASPIRIN PO      Take 81 mg by mouth       atorvastatin 20 MG tablet    LIPITOR    30 tablet    Take 1 tablet (20 mg) by mouth At Bedtime       blood glucose monitoring lancets     1 Box    Use to test blood sugars 1 times daily       blood glucose monitoring meter device kit     1 kit    Use to test blood sugars 1x times daily       blood glucose monitoring test strip    ONE TOUCH VERIO IQ    100 strip    Use to test blood sugars 1x times daily       calcium carbonate-Vit D-Min 600-400 MG-UNIT per tablet     100 tablet    Take 1 tablet by mouth daily. FOR BONE HEALTH        FISH OIL PO      Take  by mouth.       ibuprofen 200 MG tablet    ADVIL/MOTRIN     Take 200 mg by mouth every 4 hours as needed for mild pain Reported on 4/19/2017       L-Tryptophan 500 MG Caps      Take by mouth daily       lisinopril-hydrochlorothiazide 20-12.5 MG per tablet    PRINZIDE/ZESTORETIC    30 tablet    Take 1 tablet by mouth daily       MELATONIN PO      Take  by mouth.       metFORMIN 500 MG tablet    GLUCOPHAGE    60 tablet    Take 1 tablet (500 mg) by mouth 2 times daily (with meals)       MULTIVITAMIN PO      None Entered       OTEZLA PO      Take by mouth daily       predniSONE 2.5 MG tablet    DELTASONE     Take 2.5 mg by mouth daily       VITAMIN B-1 PO      Take  by mouth.       VITAMIN C PO      Take  by mouth.       VITAMIN E PO      Take  by mouth.

## 2017-05-30 NOTE — NURSING NOTE
"Chief Complaint   Patient presents with     Diabetes       Initial /68  Pulse 95  Temp 98.2  F (36.8  C) (Oral)  Wt 251 lb 3.2 oz (113.9 kg)  LMP 04/11/2000  SpO2 96%  BMI 44.5 kg/m2 Estimated body mass index is 44.5 kg/(m^2) as calculated from the following:    Height as of 3/31/17: 5' 3\" (1.6 m).    Weight as of this encounter: 251 lb 3.2 oz (113.9 kg).  Medication Reconciliation: complete    "

## 2017-05-30 NOTE — PROGRESS NOTES
SUBJECTIVE:                                                    Josefina Maldonado is a 66 year old female who presents to clinic today for the following health issues:      Diabetes Follow-up  Lab Results   Component Value Date    A1C 7.4 04/19/2017    A1C 6.3 04/11/2003       Patient is checking blood sugars: rarely.  Results range from 92 to 123    As noted pt NOT CHECKING FASTING # IN THE AM        Diabetic concerns: None     Symptoms of hypoglycemia (low blood sugar): none     Paresthesias (numbness or burning in feet) or sores: Yes More in the legs     Date of last diabetic eye exam: has not had one       Amount of exercise or physical activity: None    Problems taking medications regularly: No    Medication side effects: none    Diet: regular (no restrictions)      Bilateral LE numbness/tingling    Problem list and histories reviewed & adjusted, as indicated.  Additional history: as documented    Labs reviewed in EPIC    Reviewed and updated as needed this visit by clinical staff  Allergies       Reviewed and updated as needed this visit by Provider         ROS:  Constitutional, HEENT, cardiovascular, pulmonary, gi and gu systems are negative, except as otherwise noted.    OBJECTIVE:                                                    /68  Pulse 95  Temp 98.2  F (36.8  C) (Oral)  Wt 251 lb 3.2 oz (113.9 kg)  LMP 04/11/2000  SpO2 96%  BMI 44.5 kg/m2  Body mass index is 44.5 kg/(m^2).  GENERAL APPEARANCE: alert, no distress and over weight  NECK: no adenopathy, no asymmetry, masses, or scars and thyroid normal to palpation  RESP: lungs clear to auscultation - no rales, rhonchi or wheezes  CV: regular rates and rhythm, normal S1 S2, no S3 or S4 and no murmur, click or rub  MS: extremities normal- no gross deformities noted  SKIN: no suspicious lesions or rashes  DIABETIC FOOT EXAM: normal DP and PT pulses, no trophic changes or ulcerative lesions and normal sensory exam    Diagnostic test  results:  Results for orders placed or performed in visit on 05/30/17   Vitamin B12   Result Value Ref Range    Vitamin B12 1804 (H) 193 - 986 pg/mL   Folate   Result Value Ref Range    Folate 11.0 >5.4 ng/mL   TSH with free T4 reflex   Result Value Ref Range    TSH 0.25 (L) 0.40 - 4.00 mU/L   ESR: Erythrocyte sedimentation rate   Result Value Ref Range    Sed Rate 22 0 - 30 mm/h   CBC with platelets   Result Value Ref Range    WBC 12.3 (H) 4.0 - 11.0 10e9/L    RBC Count 4.90 3.8 - 5.2 10e12/L    Hemoglobin 15.2 11.7 - 15.7 g/dL    Hematocrit 47.2 (H) 35.0 - 47.0 %    MCV 96 78 - 100 fl    MCH 31.0 26.5 - 33.0 pg    MCHC 32.2 31.5 - 36.5 g/dL    RDW 14.6 10.0 - 15.0 %    Platelet Count 301 150 - 450 10e9/L   T4 free   Result Value Ref Range    T4 Free 1.13 0.76 - 1.46 ng/dL        ASSESSMENT/PLAN:                                                    1. Type 2 diabetes mellitus with hyperglycemia, without long-term current use of insulin (H)  Lab Results   Component Value Date    A1C 7.4 04/19/2017    A1C 6.3 04/11/2003     Continue present plan - get more AM fasting #. F/u 1 mo   - TSH with free T4 reflex  - T4 free    2. Neuropathic pain of lower extremity, unspecified laterality  Doesn't seem consistent with diabetic neuropathy - palmira given recent dx   - Vitamin B12  - Folate  - TSH with free T4 reflex  - ESR: Erythrocyte sedimentation rate  - CBC with platelets    3. Essential hypertension  Good control     4. Psoriatic arthritis (H)  Per rheum       Follow up with Provider - as above     Virgil Goodson MD  [facility]

## 2017-06-26 DIAGNOSIS — E11.65 TYPE 2 DIABETES MELLITUS WITH HYPERGLYCEMIA, WITHOUT LONG-TERM CURRENT USE OF INSULIN (H): ICD-10-CM

## 2017-06-26 NOTE — TELEPHONE ENCOUNTER
metFORMIN         Last Written Prescription Date: 06/07/17  Last Fill Quantity: 60, # refills: 1  Last Office Visit with G, P or [facility] prescribing provider:  05/30/17        BP Readings from Last 3 Encounters:   05/30/17 108/68   04/19/17 118/72   03/31/17 150/90     No results found for: MICROL  No results found for: UMALCR  Creatinine   Date Value Ref Range Status   04/19/2017 0.76 0.52 - 1.04 mg/dL Final   ]  GFR Estimate   Date Value Ref Range Status   04/19/2017 75 >60 mL/min/1.7m2 Final     Comment:     Non  GFR Calc   04/04/2017 79.2 mL/min/1.73m2 Final   03/31/2017 >90  Non  GFR Calc   >60 mL/min/1.7m2 Final     GFR Estimate If Black   Date Value Ref Range Status   04/19/2017 >90   GFR Calc   >60 mL/min/1.7m2 Final   03/31/2017 >90   GFR Calc   >60 mL/min/1.7m2 Final   03/05/2012 >90 >60 mL/min/1.7m2 Final     Lab Results   Component Value Date    CHOL 174 04/19/2017     Lab Results   Component Value Date    HDL 48 04/19/2017     Lab Results   Component Value Date    LDL 94 04/19/2017     Lab Results   Component Value Date    TRIG 161 04/19/2017     Lab Results   Component Value Date    CHOLHDLRATIO 4 04/11/2003     Lab Results   Component Value Date    AST 23 04/04/2017     Lab Results   Component Value Date    ALT 32 04/04/2017     Lab Results   Component Value Date    A1C 7.4 04/19/2017    A1C 6.3 04/11/2003     Potassium   Date Value Ref Range Status   04/19/2017 3.8 3.4 - 5.3 mmol/L Final

## 2017-06-29 DIAGNOSIS — I10 ESSENTIAL HYPERTENSION: ICD-10-CM

## 2017-06-30 RX ORDER — LISINOPRIL AND HYDROCHLOROTHIAZIDE 12.5; 2 MG/1; MG/1
1 TABLET ORAL DAILY
Qty: 30 TABLET | Refills: 3 | Status: SHIPPED | OUTPATIENT
Start: 2017-06-30 | End: 2018-02-06

## 2017-06-30 NOTE — TELEPHONE ENCOUNTER
lisinopril-hydrochlorothiazide (PRINZIDE/ZESTORETIC) 20-12.5 MG per tablet      Last Written Prescription Date: 4/19/17  Last Fill Quantity: 30, # refills: 5  Last Office Visit with G, [facility] or [facility] prescribing provider: 5/30/17       Potassium   Date Value Ref Range Status   04/19/2017 3.8 3.4 - 5.3 mmol/L Final     Creatinine   Date Value Ref Range Status   04/19/2017 0.76 0.52 - 1.04 mg/dL Final     BP Readings from Last 3 Encounters:   05/30/17 108/68   04/19/17 118/72   03/31/17 150/90

## 2017-07-09 ENCOUNTER — MYC MEDICAL ADVICE (OUTPATIENT)
Dept: INTERNAL MEDICINE | Facility: CLINIC | Age: 67
End: 2017-07-09

## 2017-07-09 DIAGNOSIS — E11.65 TYPE 2 DIABETES MELLITUS WITH HYPERGLYCEMIA, WITHOUT LONG-TERM CURRENT USE OF INSULIN (H): ICD-10-CM

## 2017-07-10 ENCOUNTER — TRANSFERRED RECORDS (OUTPATIENT)
Dept: HEALTH INFORMATION MANAGEMENT | Facility: CLINIC | Age: 67
End: 2017-07-10

## 2017-07-10 RX ORDER — ATORVASTATIN CALCIUM 20 MG/1
20 TABLET, FILM COATED ORAL AT BEDTIME
Qty: 90 TABLET | Refills: 0 | Status: SHIPPED | OUTPATIENT
Start: 2017-07-10 | End: 2017-10-03

## 2017-08-14 ENCOUNTER — TELEPHONE (OUTPATIENT)
Dept: INTERNAL MEDICINE | Facility: CLINIC | Age: 67
End: 2017-08-14

## 2017-08-14 NOTE — TELEPHONE ENCOUNTER
[facility]- would need to bring in copy of records from [facility], MRI films/report -as all of this would need to reviewed at visit before any injection could be scheduled.

## 2017-08-14 NOTE — TELEPHONE ENCOUNTER
Reason for Call:  Other call back    Detailed comments: Pt has a pinched nerve in her back.  She went [facility].  They have scheduled a steriod injection for 8/29/17. The pt's  is asking if there is any clinic that Dr Goodson knows that can see her sooner for the injection.  She is having trouble walking.  She is on hydrocodone, steroids and a muscle relaxer.  declined an appt.      Phone Number Patient can be reached at: Home number on file [phone] (home)    Best Time: anytime    Can we leave a detailed message on this number? YES    Call taken on 8/14/2017 at 12:43 PM by INNA ULLOA

## 2017-09-01 DIAGNOSIS — E11.65 TYPE 2 DIABETES MELLITUS WITH HYPERGLYCEMIA, WITHOUT LONG-TERM CURRENT USE OF INSULIN (H): ICD-10-CM

## 2017-09-04 DIAGNOSIS — E11.65 TYPE 2 DIABETES MELLITUS WITH HYPERGLYCEMIA, WITHOUT LONG-TERM CURRENT USE OF INSULIN (H): ICD-10-CM

## 2017-10-03 DIAGNOSIS — E11.65 TYPE 2 DIABETES MELLITUS WITH HYPERGLYCEMIA, WITHOUT LONG-TERM CURRENT USE OF INSULIN (H): ICD-10-CM

## 2017-10-03 RX ORDER — ATORVASTATIN CALCIUM 20 MG/1
TABLET, FILM COATED ORAL
Qty: 90 TABLET | Refills: 2 | Status: SHIPPED | OUTPATIENT
Start: 2017-10-03 | End: 2018-06-12

## 2017-10-04 NOTE — TELEPHONE ENCOUNTER
atorvastatin (LIPITOR) 20 MG tablet  Last Written Prescription Date: 7/10/17  Last Fill Quantity: 90, # refills: 0  Last Office Visit with [facility], [facility] or [facility] prescribing provider: 5/30/17       Lab Results   Component Value Date    CHOL 174 04/19/2017     Lab Results   Component Value Date    HDL 48 04/19/2017     Lab Results   Component Value Date    LDL 94 04/19/2017     Lab Results   Component Value Date    TRIG 161 04/19/2017     Lab Results   Component Value Date    CHOLHDLRATIO 4 04/11/2003     Prescription approved per [facility] Refill Protocol.

## 2017-11-13 ENCOUNTER — TRANSFERRED RECORDS (OUTPATIENT)
Dept: HEALTH INFORMATION MANAGEMENT | Facility: CLINIC | Age: 67
End: 2017-11-13

## 2017-11-13 LAB
ALT SERPL-CCNC: 27 IU/L (ref 5–35)
AST SERPL-CCNC: 22 U/L (ref 5–34)
CREAT SERPL-MCNC: 0.62 MG/DL (ref 0.5–1.3)
GFR SERPL CREATININE-BSD FRML MDRD: 101.4 ML/MIN/1.73M2

## 2018-01-11 ENCOUNTER — HOSPITAL ENCOUNTER (EMERGENCY)
Facility: CLINIC | Age: 68
Discharge: HOME | End: 2018-01-11
Attending: EMERGENCY MEDICINE | Admitting: EMERGENCY MEDICINE
Payer: OTHER MISCELLANEOUS

## 2018-01-11 ENCOUNTER — APPOINTMENT (OUTPATIENT)
Dept: GENERAL RADIOLOGY | Facility: CLINIC | Age: 68
End: 2018-01-11
Attending: EMERGENCY MEDICINE
Payer: OTHER MISCELLANEOUS

## 2018-01-11 VITALS
SYSTOLIC BLOOD PRESSURE: 122 MMHG | BODY MASS INDEX: 41.64 KG/M2 | TEMPERATURE: 97.8 F | HEART RATE: 87 BPM | HEIGHT: 63 IN | DIASTOLIC BLOOD PRESSURE: 85 MMHG | RESPIRATION RATE: 16 BRPM | WEIGHT: 235 LBS | OXYGEN SATURATION: 96 %

## 2018-01-11 DIAGNOSIS — S42.291A CLOSED FRACTURE OF HEAD OF RIGHT HUMERUS, INITIAL ENCOUNTER: ICD-10-CM
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
## 2018-01-11 PROCEDURE — 96374 THER/PROPH/DIAG INJ IV PUSH: CPT

## 2018-01-11 PROCEDURE — 99285 EMERGENCY DEPT VISIT HI MDM: CPT | Mod: 25

## 2018-01-11 PROCEDURE — 73030 X-RAY EXAM OF SHOULDER: CPT | Mod: RT

## 2018-01-11 PROCEDURE — 23600 CLTX PROX HUMRL FX W/O MNPJ: CPT | Mod: RT

## 2018-01-11 PROCEDURE — 96376 TX/PRO/DX INJ SAME DRUG ADON: CPT

## 2018-01-11 PROCEDURE — 25000128 H RX IP 250 OP 636: Performed by: EMERGENCY MEDICINE

## 2018-01-11 PROCEDURE — 96375 TX/PRO/DX INJ NEW DRUG ADDON: CPT

## 2018-01-11 RX ORDER — MORPHINE SULFATE 4 MG/ML
4 INJECTION, SOLUTION INTRAMUSCULAR; INTRAVENOUS ONCE
Status: COMPLETED | OUTPATIENT
Start: 2018-01-11 | End: 2018-01-11

## 2018-01-11 RX ORDER — OXYCODONE HYDROCHLORIDE 5 MG/1
5-10 TABLET ORAL EVERY 4 HOURS PRN
Qty: 20 TABLET | Refills: 0 | Status: SHIPPED | OUTPATIENT
Start: 2018-01-11 | End: 2018-02-06

## 2018-01-11 RX ORDER — HYDROMORPHONE HYDROCHLORIDE 1 MG/ML
0.5 INJECTION, SOLUTION INTRAMUSCULAR; INTRAVENOUS; SUBCUTANEOUS
Status: COMPLETED | OUTPATIENT
Start: 2018-01-11 | End: 2018-01-11

## 2018-01-11 RX ORDER — KETOROLAC TROMETHAMINE 30 MG/ML
30 INJECTION, SOLUTION INTRAMUSCULAR; INTRAVENOUS ONCE
Status: COMPLETED | OUTPATIENT
Start: 2018-01-11 | End: 2018-01-11

## 2018-01-11 RX ADMIN — Medication 0.5 MG: at 07:30

## 2018-01-11 RX ADMIN — MORPHINE SULFATE 4 MG: 4 INJECTION, SOLUTION INTRAMUSCULAR; INTRAVENOUS at 08:38

## 2018-01-11 RX ADMIN — MORPHINE SULFATE 4 MG: 4 INJECTION, SOLUTION INTRAMUSCULAR; INTRAVENOUS at 09:55

## 2018-01-11 RX ADMIN — Medication 0.5 MG: at 07:58

## 2018-01-11 RX ADMIN — KETOROLAC TROMETHAMINE 30 MG: 30 INJECTION, SOLUTION INTRAMUSCULAR at 09:55

## 2018-01-11 RX ADMIN — Medication 0.5 MG: at 08:21

## 2018-01-11 ASSESSMENT — ENCOUNTER SYMPTOMS
NECK PAIN: 1
BACK PAIN: 0
ARTHRALGIAS: 1

## 2018-01-11 NOTE — ED AVS SNAPSHOT
Emergency Department    [address] [place] [address]    Phone:  [phone]    Fax:  [phone]                                       Josefina Maldonado   MRN: [id number]    Department:   Emergency Department   Date of Visit:  1/11/2018           After Visit Summary Signature Page     I have received my discharge instructions, and my questions have been answered. I have discussed any challenges I see with this plan with the nurse or doctor.    ..........................................................................................................................................  Patient/Patient Representative Signature      ..........................................................................................................................................  Patient Representative Print Name and Relationship to Patient    ..................................................               ................................................  Date                                            Time    ..........................................................................................................................................  Reviewed by Signature/Title    ...................................................              ..............................................  Date                                                            Time

## 2018-01-11 NOTE — ED AVS SNAPSHOT
Emergency Department    [address] [facility] [address]    Phone:  [phone]    Fax:  [phone]                                       Josefina Maldonado   MRN: [id number]    Department:   Emergency Department   Date of Visit:  1/11/2018           Patient Information     Date Of Birth          1950        Your diagnoses for this visit were:     Closed fracture of head of right humerus, initial encounter     Fall, initial encounter        You were seen by Georgette Dupont MD.      Follow-up Information     Follow up with Aiden Whitley MD. Schedule an appointment as soon as possible for a visit in 2 days.    Specialty:  Orthopaedic Surgery    Contact information:    [facility]  [address] [facility] [address]  [phone]          Discharge Instructions       Ice, shoulder immobilizer, ibuprofen and/or Roxicodone as needed for pain.  Set up an appointment in the next 2 days with an orthopedic doctor.    Opioid Medication Information  You have been given a prescription for an opioid (narcotic) pain medicine and/or have received a pain medicine while here in the emergency department. These medicines can make you drowsy or impaired. You must not drive, operate dangerous equipment, or engage in any other dangerous activities while taking these medications. If you drive while taking these medications, you could be arrested for DUI, or driving under the influence. Do not drink any alcohol while you are taking these medications.   Opioid pain medications can cause addiction. If you have a history of chemical dependency of any type, you are at a higher risk of becoming addicted to pain medications. Only take these prescribed medications to treat your pain when all other options have been tried. Take it for as short a time and as few doses as possible. Store your pain pills in a secure place, as they are frequently stolen and provide a dangerous opportunity for children or  visitors in your house to start abusing these powerful medications. We will not replace any lost or stolen medicine. As soon as your pain is better, you should flush all your remaining medication.   Many prescription pain medications contain Tylenol (acetaminophen), including Vicodin, Tylenol #3, Norco, Lortab, and Percocet. You should not take any extra pills of Tylenol if you are using these prescription medications or you can get very sick. Do not ever take more than 4000 mg of acetaminophen in any 24 hour period.  All opioids tend to cause constipation. Drink plenty of water and eat foods that have a lot of fiber, such as fruits, vegetables, prune juice, apple juice and high fiber cereal. Take a laxative if you don t move your bowels at least every other day. Miralax, Milk of Magnesia, Colace, or Senna can be used to keep you regular.       Discharge References/Attachments     HUMERUS FRACTURE, UNDERSTANDING (ENGLISH)      24 Hour Appointment Hotline       To make an appointment at any [facility] clinic, call [phone] ([phone]). If you don't have a family doctor or clinic, we will help you find one. [facility] clinics are conveniently located to serve the needs of you and your family.             Review of your medicines      START taking        Dose / Directions Last dose taken    oxyCODONE IR 5 MG tablet   Commonly known as:  ROXICODONE   Dose:  5-10 mg   Quantity:  20 tablet        Take 1-2 tablets (5-10 mg) by mouth every 4 hours as needed for moderate to severe pain   Refills:  0          Our records show that you are taking the medicines listed below. If these are incorrect, please call your family doctor or clinic.        Dose / Directions Last dose taken    5-HTP PO        Reported on 3/31/2017   Refills:  0        ASPIRIN PO   Dose:  81 mg        Take 81 mg by mouth   Refills:  0        atorvastatin 20 MG tablet   Commonly known as:  LIPITOR   Quantity:  90 tablet        TAKE 1 TABLET AT  BEDTIME   Refills:  2        blood glucose monitoring lancets   Quantity:  1 Box        Use to test blood sugars 1 times daily   Refills:  11        blood glucose monitoring meter device kit   Quantity:  1 kit        Use to test blood sugars 1x times daily   Refills:  0        blood glucose monitoring test strip   Commonly known as:  ONETOUCH VERIO IQ   Quantity:  100 strip        Use to test blood sugars 1x times daily   Refills:  3        calcium carbonate-Vit D-Min 600-400 MG-UNIT per tablet   Dose:  1 tablet   Quantity:  100 tablet        Take 1 tablet by mouth daily. FOR BONE HEALTH   Refills:  3        FISH OIL PO        Take  by mouth.   Refills:  0        ibuprofen 200 MG tablet   Commonly known as:  ADVIL/MOTRIN   Dose:  200 mg        Take 200 mg by mouth every 4 hours as needed for mild pain Reported on 4/19/2017   Refills:  0        L-Tryptophan 500 MG Caps        Take by mouth daily   Refills:  0        lisinopril-hydrochlorothiazide 20-12.5 MG per tablet   Commonly known as:  PRINZIDE/ZESTORETIC   Dose:  1 tablet   Quantity:  30 tablet        Take 1 tablet by mouth daily   Refills:  3        MELATONIN PO        Take  by mouth.   Refills:  0        * metFORMIN 500 MG tablet   Commonly known as:  GLUCOPHAGE   Quantity:  60 tablet        TAKE ONE TABLET (500MG) BY MOUTH TWICE DAILY WITH MEALS   Refills:  1        * metFORMIN 500 MG tablet   Commonly known as:  GLUCOPHAGE   Quantity:  60 tablet        TAKE 1 TABLET TWICE A DAY WITH MEALS   Refills:  2        MULTIVITAMIN PO        None Entered   Refills:  0        OTEZLA PO        Take by mouth daily   Refills:  0        predniSONE 2.5 MG tablet   Commonly known as:  DELTASONE   Dose:  2.5 mg        Take 2.5 mg by mouth daily   Refills:  0        VITAMIN B-1 PO        Take  by mouth.   Refills:  0        VITAMIN C PO        Take  by mouth.   Refills:  0        VITAMIN E PO        Take  by mouth.   Refills:  0        * Notice:  This list has 2 medication(s)  that are the same as other medications prescribed for you. Read the directions carefully, and ask your doctor or other care provider to review them with you.            Prescriptions were sent or printed at these locations (1 Prescription)                   Other Prescriptions                Printed at Department/Unit printer (1 of 1)         oxyCODONE IR (ROXICODONE) 5 MG tablet                Procedures and tests performed during your visit     XR Shoulder Right G/E 3 Views      Orders Needing Specimen Collection     None      Pending Results     No orders found from 1/9/2018 to 1/12/2018.            Pending Culture Results     No orders found from 1/9/2018 to 1/12/2018.            Pending Results Instructions     If you had any lab results that were not finalized at the time of your Discharge, you can call the ED Lab Result RN at [phone]. You will be contacted by this team for any positive Lab results or changes in treatment. The nurses are available 7 days a week from 10A to 6:30P.  You can leave a message 24 hours per day and they will return your call.        Test Results From Your Hospital Stay        1/11/2018  9:29 AM      Narrative     XR SHOULDER RT G/E 3 VW 1/11/2018 9:24 AM    HISTORY: Fall.    COMPARISON: None.        Impression     IMPRESSION: Mildly displaced comminuted fracture of the right humeral  head.    BENNETT SCOTT MD                Clinical Quality Measure: Blood Pressure Screening     Your blood pressure was checked while you were in the emergency department today. The last reading we obtained was  BP: 122/85 . Please read the guidelines below about what these numbers mean and what you should do about them.  If your systolic blood pressure (the top number) is less than 120 and your diastolic blood pressure (the bottom number) is less than 80, then your blood pressure is normal. There is nothing more that you need to do about it.  If your systolic blood pressure (the top number) is  120-139 or your diastolic blood pressure (the bottom number) is 80-89, your blood pressure may be higher than it should be. You should have your blood pressure rechecked within a year by a primary care provider.  If your systolic blood pressure (the top number) is 140 or greater or your diastolic blood pressure (the bottom number) is 90 or greater, you may have high blood pressure. High blood pressure is treatable, but if left untreated over time it can put you at risk for heart attack, stroke, or kidney failure. You should have your blood pressure rechecked by a primary care provider within the next 4 weeks.  If your provider in the emergency department today gave you specific instructions to follow-up with your doctor or provider even sooner than that, you should follow that instruction and not wait for up to 4 weeks for your follow-up visit.        Thank you for choosing [facility]       Thank you for choosing [facility] for your care. Our goal is always to provide you with excellent care. Hearing back from our patients is one way we can continue to improve our services. Please take a few minutes to complete the written survey that you may receive in the mail after you visit with us. Thank you!        [employer] Information     [email or web address] gives you secure access to your electronic health record. If you see a primary care provider, you can also send messages to your care team and make appointments. If you have questions, please call your primary care clinic.  If you do not have a primary care provider, please call [phone] and they will assist you.        Care EveryWhere ID     This is your Care EveryWhere ID. This could be used by other organizations to access your [facility] medical records  [id number]        Equal Access to Services     MARGARET LEAHY : Michelle White, wamendel lutelmaadaha, qaybta kaalmar medellin, torrey wilhelm. So [facility] [phone].    ATENCIÓN: Iliana bear  español, tiene a myers disposición servicios gratuitos de asistencia lingüística. Venkata al [phone].    We comply with applicable federal civil rights laws and [place] laws. We do not discriminate on the basis of race, color, national origin, age, disability, sex, sexual orientation, or gender identity.            After Visit Summary       This is your record. Keep this with you and show to your community pharmacist(s) and doctor(s) at your next visit.

## 2018-01-11 NOTE — ED PROVIDER NOTES
"  History     Chief Complaint:  Fall     HPI   Josefina Maldonado is a 67 year old female, with a history of psoriatic arthritis, who presents to the ED for evaluation of mechanical fall. Per EMS, the patient was walking back out to her car at work when she slipped, injuring her right shoulder on the door. The patient was not given any pain medication. Upon evaluation, the patient reports she has bilateral neck pain as well. The patient denies any loss of consciousness, head trauma, back pain, or other pain.      Allergies:  Latex      Medications:    Lipitor  Metformin   Lisinopril-hydrochlorothiazide  Otezla   L-tryptophan   Vitamin E  H-HTP  Melatonin  Fish oil  Aspirin   Vitamin C  Vitamin B-1  Calcium carbonate-Vit D-min  Multivitamin     Past Medical History:    Type 2 DM  Psoriatic arthritis  Hypersomnia w/ sleep apnea on CPAP   HTN  ADD  Depressive personality disorder  DDD  Obesity   Rheumatic fever     Past Surgical History:    History reviewed. No pertinent surgical history.    Family History:    Arthritis  CAD  Diabetes  COPD     Social History:  Smoking status: Current ever day smoker  Alcohol use: No  Marital Status:   [2]     Review of Systems   Musculoskeletal: Positive for arthralgias and neck pain. Negative for back pain.   Neurological: Negative for syncope.   All other systems reviewed and are negative.    Physical Exam     Patient Vitals for the past 24 hrs:   BP Temp Temp src Pulse Resp SpO2 Height Weight   01/11/18 0900 - - - - - 96 % - -   01/11/18 0830 122/85 - - - - 95 % - -   01/11/18 0824 107/85 - - - - 97 % - -   01/11/18 0815 - - - - - 95 % - -   01/11/18 0745 - - - - - 95 % - -   01/11/18 0730 - - - - - 96 % - -   01/11/18 0704 119/80 97.8  F (36.6  C) Oral 87 20 97 % 1.6 m (5' 3\") 106.6 kg (235 lb)     Physical Exam  Nursing note and vitals reviewed.  Constitutional:  Appears well-developed and well-nourished, uncomfortable.   HENT:   Head:   No evidence of facial or scalp " trauma.  Nose:    Nose normal.   Mouth/Throat:  Mucosa is moist.  Eyes:    Conjunctivae are normal.      Pupils are equal, round, and reactive to light.      Right eye exhibits no discharge. Left eye exhibits no discharge.      No scleral icterus.   Cardiovascular:  Normal rate, regular rhythm.      Normal heart sounds and intact distal pulses.       No murmur heard.  Pulmonary/Chest:  Effort normal and breath sounds normal. No respiratory distress.     No wheezes. No rales. No chest wall tenderness. No stridor.   Abdominal:   Soft. No distension and no mass. No tenderness.      No rebound and no guarding. No flank pain.  Musculoskeletal:  Bilateral paraspinal tenderness over the cervical spine. No thoracic or lumbar tenderness.      Significant pain in right shoulder with any movement. Mild swelling.      Good perfusion and CMS in arm and hand. No pain in hips or knees.      Normal range of motion except in the right shoulder.      No edema.   Neurological:   Alert and oriented to person, place, and year.      No cranial nerve deficit.      Exhibits normal muscle tone. Coordination normal.      GCS eye subscore is 4. GCS verbal subscore is 5.      GCS motor subscore is 6.   Skin:    Abrasion at base of palm. Skin is warm and dry. No rash noted. No diaphoresis.      No erythema. No pallor.   Psychiatric:   Behavior is normal. Judgment and thought content normal.     Emergency Department Course     Imaging:  Radiographic findings were communicated with the patient who voiced understanding of the findings.    XR Shoulder Right G/E 3 Views  IMPRESSION: Mildly displaced comminuted fracture of the right humeral  head. As read by Radiology.    Interventions:  0730: Dilaudid 0.5mg IV  0758: Dilaudid 0.5mg IV  0821: Dilaudid 0.5mg IV  0838: Morphine 4mg IV   0954: Morphine 4mg IV    0955: Toradol 30mg IV     Emergency Department Course:  Patient arrived by EMS.     0657: I performed an exam of the patient and obtained  history, as documented above.    Past medical records, nursing notes, and vitals reviewed.    The patient was sent for a right shoulder x-ray while in the emergency department, findings above.    0935: I rechecked the patient. Explained findings to patient.    1000: Patient was placed in a right shoulder immobilizer.     1009: I rechecked the patient. Findings and plan explained to the Patient. Patient discharged home with instructions regarding supportive care, medications, and reasons to return. The importance of close follow-up was reviewed.     Impression & Plan      Medical Decision Making:  Patient comes in after falling with severe right shoulder pain.  Mild swelling is noted.  X-ray shows a mildly displaced humeral head fracture.  No dislocation.  After numerous doses of narcotic pain medicine, we were able to get her in a shoulder immobilizer and she is feeling better.  I will have her follow-up with orthopedics and send her home with pain medicine.  She had a small abrasion on her hand but no other injury.  I expect that she will be more stiff as the day goes on.  She has good CMS distally in her right hand and wrist and I do not feel she needs advanced imaging in the emergency room.  This is a work comp injury.  Workability will need to be determined by the orthopedist who she should see tomorrow.  I will keep her off work today and tomorrow.    Diagnosis:    ICD-10-CM   1. Closed fracture of head of right humerus, initial encounter S42.291A   2. Fall, initial encounter W19.XXXA     Disposition: Patient discharged to home. Ice, shoulder immobilizer, ibuprofen and/or Roxicodone as needed for pain.  Set up an appointment in the next 2 days with an orthopedic doctor.    Discharge Medications:  New Prescriptions    OXYCODONE IR (ROXICODONE) 5 MG TABLET    Take 1-2 tablets (5-10 mg) by mouth every 4 hours as needed for moderate to severe pain     Arabella Gomes  1/11/2018    EMERGENCY DEPARTMENT    I,  Arabella Gomes, am serving as a scribe at 6:57 AM on 1/11/2018 to document services personally performed by Georgette Dupont MD based on my observations and the provider's statements to me.        Georgette Dupont MD  01/11/18 1138

## 2018-01-11 NOTE — ED NOTES
Bed: ED02  Expected date: 1/11/18  Expected time: 6:55 AM  Means of arrival: Ambulance  Comments:  [facility] 413 67F fall; shoulder inj; ETA 0138

## 2018-01-11 NOTE — ED NOTES
Pt still havign a lot of pain and unable to remove shirt. Second dose dilaudid given, waiting for post check to see if it helps.

## 2018-01-11 NOTE — DISCHARGE INSTRUCTIONS
Ice, shoulder immobilizer, ibuprofen and/or Roxicodone as needed for pain.  Set up an appointment in the next 2 days with an orthopedic doctor.    Opioid Medication Information  You have been given a prescription for an opioid (narcotic) pain medicine and/or have received a pain medicine while here in the emergency department. These medicines can make you drowsy or impaired. You must not drive, operate dangerous equipment, or engage in any other dangerous activities while taking these medications. If you drive while taking these medications, you could be arrested for DUI, or driving under the influence. Do not drink any alcohol while you are taking these medications.   Opioid pain medications can cause addiction. If you have a history of chemical dependency of any type, you are at a higher risk of becoming addicted to pain medications. Only take these prescribed medications to treat your pain when all other options have been tried. Take it for as short a time and as few doses as possible. Store your pain pills in a secure place, as they are frequently stolen and provide a dangerous opportunity for children or visitors in your house to start abusing these powerful medications. We will not replace any lost or stolen medicine. As soon as your pain is better, you should flush all your remaining medication.   Many prescription pain medications contain Tylenol (acetaminophen), including Vicodin, Tylenol #3, Norco, Lortab, and Percocet. You should not take any extra pills of Tylenol if you are using these prescription medications or you can get very sick. Do not ever take more than 4000 mg of acetaminophen in any 24 hour period.  All opioids tend to cause constipation. Drink plenty of water and eat foods that have a lot of fiber, such as fruits, vegetables, prune juice, apple juice and high fiber cereal. Take a laxative if you don t move your bowels at least every other day. Miralax, Milk of Magnesia, Colace, or Senna can  be used to keep you regular.

## 2018-01-25 DIAGNOSIS — E11.65 TYPE 2 DIABETES MELLITUS WITH HYPERGLYCEMIA, WITHOUT LONG-TERM CURRENT USE OF INSULIN (H): ICD-10-CM

## 2018-01-25 NOTE — LETTER
[facility]  [address] [id number] [phone]            Josefina Maldonado  [address]  [facility] [address]        January 25, 2018    Dear Josefina,    While refilling your prescription today, we noticed that you are due to have labs drawn and see your provider.  We will refill your prescription for 30 days, but a follow-up appointment must be made before any additional refills can be approved.     Taking care of your health is important to us and we look forward to seeing you in the near future.  Please call us at [phone] or [phone] (or use Sp[employer]) to schedule an appointment.     Please disregard this notice if you have already made an appointment.    Sincerely,        [facility]

## 2018-01-25 NOTE — TELEPHONE ENCOUNTER
Medication is being filled for 1 time refill only due to:  Patient needs labs and office visit.  letter sent.

## 2018-01-25 NOTE — TELEPHONE ENCOUNTER
"Requested Prescriptions   Pending Prescriptions Disp Refills     metFORMIN (GLUCOPHAGE) 500 MG tablet [Pharmacy Med Name: METFORMIN HCL TABS 500MG] 60 tablet 2          Last Written Prescription Date:  09/06/17  Last Fill Quantity: 60,  # refills: 2   Last Office Visit with G, GLENNA or [facility] prescribing provider:  05/30/17   Future Office Visit:   0 Sig: TAKE 1 TABLET TWICE A DAY WITH MEALS    Biguanide Agents Failed    1/25/2018  9:32 AM       Failed - Patient has had a Microalbumin in the past 12 mos.    No lab results found.         Failed - Patient has documented A1c within the specified period of time.    Recent Labs   Lab Test  04/19/17   1010   A1C  7.4*            Failed - Recent (6 mos) or future visit with authorizing provider's specialty    Patient had office visit in the last 6 months or has a visit in the next 30 days with authorizing provider.  See \"Patient Info\" tab in inbasket, or \"Choose Columns\" in Meds & Orders section of the refill encounter.           Passed - Patient's BP is less than 140/90    BP Readings from Last 3 Encounters:   01/11/18 122/85   05/30/17 108/68   04/19/17 118/72                Passed - Patient has documented LDL within the past 12 mos.    Recent Labs   Lab Test  04/19/17   1010   LDL  94            Passed - Patient is age 10 or older       Passed - Patient's CR is NOT>1.4 OR Patient's EGFR is NOT<45 within past 12 mos.    Recent Labs   Lab Test 11/13/17 04/19/17   1010   GFRESTIMATED  101.4  75   GFRESTBLACK   --   >90   GFR Calc         Recent Labs   Lab Test 11/13/17   CR  0.620            Passed - Patient does NOT have a diagnosis of CHF.       Passed - Patient is not pregnant       Passed - Patient has not had a positive pregnancy test within the past 12 mos.         "

## 2018-02-06 ENCOUNTER — OFFICE VISIT (OUTPATIENT)
Dept: INTERNAL MEDICINE | Facility: CLINIC | Age: 68
End: 2018-02-06
Payer: COMMERCIAL

## 2018-02-06 VITALS
OXYGEN SATURATION: 97 % | SYSTOLIC BLOOD PRESSURE: 122 MMHG | RESPIRATION RATE: 20 BRPM | HEART RATE: 87 BPM | WEIGHT: 237.4 LBS | BODY MASS INDEX: 42.05 KG/M2 | DIASTOLIC BLOOD PRESSURE: 70 MMHG | TEMPERATURE: 97.9 F

## 2018-02-06 DIAGNOSIS — Z23 NEED FOR PROPHYLACTIC VACCINATION AND INOCULATION AGAINST INFLUENZA: ICD-10-CM

## 2018-02-06 DIAGNOSIS — I10 ESSENTIAL HYPERTENSION: ICD-10-CM

## 2018-02-06 DIAGNOSIS — Z23 NEED FOR VACCINATION: ICD-10-CM

## 2018-02-06 DIAGNOSIS — Z12.31 VISIT FOR SCREENING MAMMOGRAM: ICD-10-CM

## 2018-02-06 DIAGNOSIS — E11.65 TYPE 2 DIABETES MELLITUS WITH HYPERGLYCEMIA, WITHOUT LONG-TERM CURRENT USE OF INSULIN (H): Primary | ICD-10-CM

## 2018-02-06 DIAGNOSIS — D72.829 LEUKOCYTOSIS, UNSPECIFIED TYPE: ICD-10-CM

## 2018-02-06 DIAGNOSIS — S42.291A CLOSED FRACTURE OF HEAD OF RIGHT HUMERUS, INITIAL ENCOUNTER: ICD-10-CM

## 2018-02-06 DIAGNOSIS — Z23 NEED FOR PROPHYLACTIC VACCINATION AGAINST STREPTOCOCCUS PNEUMONIAE (PNEUMOCOCCUS): ICD-10-CM

## 2018-02-06 DIAGNOSIS — R79.89 LOW TSH LEVEL: ICD-10-CM

## 2018-02-06 LAB
ANION GAP SERPL CALCULATED.3IONS-SCNC: 7 MMOL/L (ref 3–14)
BUN SERPL-MCNC: 22 MG/DL (ref 7–30)
CALCIUM SERPL-MCNC: 9.7 MG/DL (ref 8.5–10.1)
CHLORIDE SERPL-SCNC: 104 MMOL/L (ref 94–109)
CO2 SERPL-SCNC: 29 MMOL/L (ref 20–32)
CREAT SERPL-MCNC: 0.73 MG/DL (ref 0.52–1.04)
ERYTHROCYTE [DISTWIDTH] IN BLOOD BY AUTOMATED COUNT: 14.8 % (ref 10–15)
GFR SERPL CREATININE-BSD FRML MDRD: 79 ML/MIN/1.7M2
GLUCOSE SERPL-MCNC: 102 MG/DL (ref 70–99)
HBA1C MFR BLD: 5.6 % (ref 4.3–6)
HCT VFR BLD AUTO: 42 % (ref 35–47)
HGB BLD-MCNC: 13.1 G/DL (ref 11.7–15.7)
MCH RBC QN AUTO: 30.1 PG (ref 26.5–33)
MCHC RBC AUTO-ENTMCNC: 31.2 G/DL (ref 31.5–36.5)
MCV RBC AUTO: 97 FL (ref 78–100)
PLATELET # BLD AUTO: 372 10E9/L (ref 150–450)
POTASSIUM SERPL-SCNC: 4.2 MMOL/L (ref 3.4–5.3)
RBC # BLD AUTO: 4.35 10E12/L (ref 3.8–5.2)
SODIUM SERPL-SCNC: 140 MMOL/L (ref 133–144)
TSH SERPL DL<=0.005 MIU/L-ACNC: 0.82 MU/L (ref 0.4–4)
WBC # BLD AUTO: 11.6 10E9/L (ref 4–11)

## 2018-02-06 PROCEDURE — 84443 ASSAY THYROID STIM HORMONE: CPT | Performed by: INTERNAL MEDICINE

## 2018-02-06 PROCEDURE — 83036 HEMOGLOBIN GLYCOSYLATED A1C: CPT | Performed by: INTERNAL MEDICINE

## 2018-02-06 PROCEDURE — 80048 BASIC METABOLIC PNL TOTAL CA: CPT | Performed by: INTERNAL MEDICINE

## 2018-02-06 PROCEDURE — 90662 IIV NO PRSV INCREASED AG IM: CPT | Performed by: INTERNAL MEDICINE

## 2018-02-06 PROCEDURE — 36415 COLL VENOUS BLD VENIPUNCTURE: CPT | Performed by: INTERNAL MEDICINE

## 2018-02-06 PROCEDURE — 99214 OFFICE O/P EST MOD 30 MIN: CPT | Mod: 25 | Performed by: INTERNAL MEDICINE

## 2018-02-06 PROCEDURE — 90670 PCV13 VACCINE IM: CPT | Performed by: INTERNAL MEDICINE

## 2018-02-06 PROCEDURE — G0008 ADMIN INFLUENZA VIRUS VAC: HCPCS | Performed by: INTERNAL MEDICINE

## 2018-02-06 PROCEDURE — 85027 COMPLETE CBC AUTOMATED: CPT | Performed by: INTERNAL MEDICINE

## 2018-02-06 PROCEDURE — G0009 ADMIN PNEUMOCOCCAL VACCINE: HCPCS | Performed by: INTERNAL MEDICINE

## 2018-02-06 RX ORDER — LISINOPRIL AND HYDROCHLOROTHIAZIDE 12.5; 2 MG/1; MG/1
1 TABLET ORAL DAILY
Qty: 90 TABLET | Refills: 1 | Status: SHIPPED | OUTPATIENT
Start: 2018-02-06 | End: 2018-06-22

## 2018-02-06 ASSESSMENT — ANXIETY QUESTIONNAIRES
IF YOU CHECKED OFF ANY PROBLEMS ON THIS QUESTIONNAIRE, HOW DIFFICULT HAVE THESE PROBLEMS MADE IT FOR YOU TO DO YOUR WORK, TAKE CARE OF THINGS AT HOME, OR GET ALONG WITH OTHER PEOPLE: VERY DIFFICULT
7. FEELING AFRAID AS IF SOMETHING AWFUL MIGHT HAPPEN: NOT AT ALL
1. FEELING NERVOUS, ANXIOUS, OR ON EDGE: SEVERAL DAYS
GAD7 TOTAL SCORE: 6
2. NOT BEING ABLE TO STOP OR CONTROL WORRYING: SEVERAL DAYS
3. WORRYING TOO MUCH ABOUT DIFFERENT THINGS: SEVERAL DAYS
6. BECOMING EASILY ANNOYED OR IRRITABLE: NEARLY EVERY DAY
5. BEING SO RESTLESS THAT IT IS HARD TO SIT STILL: NOT AT ALL

## 2018-02-06 ASSESSMENT — PATIENT HEALTH QUESTIONNAIRE - PHQ9: 5. POOR APPETITE OR OVEREATING: NOT AT ALL

## 2018-02-06 NOTE — MR AVS SNAPSHOT
After Visit Summary   2/6/2018    Josefina Maldonado    MRN: [id number]           Patient Information     Date Of Birth          1950        Visit Information        Provider Department      2/6/2018 9:00 AM Virgil Goodson MD [facility]        Today's Diagnoses     Type 2 diabetes mellitus with hyperglycemia, without long-term current use of insulin (H)    -  1    Need for prophylactic vaccination and inoculation against influenza        Need for prophylactic vaccination against Streptococcus pneumoniae (pneumococcus)        Need for vaccination        Low TSH level        Leukocytosis, unspecified type        Visit for screening mammogram           Follow-ups after your visit        Future tests that were ordered for you today     Open Future Orders        Priority Expected Expires Ordered    MA SCREENING DIGITAL BILAT - Future  (s+30) Routine  2/6/2019 2/6/2018            Who to contact     If you have questions or need follow up information about today's clinic visit or your schedule please contact [facility] directly at [phone].  Normal or non-critical lab and imaging results will be communicated to you by Defy[employer], letter or phone within 4 business days after the clinic has received the results. If you do not hear from us within 7 days, please contact the clinic through [employer] or phone. If you have a critical or abnormal lab result, we will notify you by phone as soon as possible.  Submit refill requests through [employer] or call your pharmacy and they will forward the refill request to us. Please allow 3 business days for your refill to be completed.          Additional Information About Your Visit        [employer] Information     [employer] gives you secure access to your electronic health record. If you see a primary care provider, you can also send messages to your care team and make appointments. If you have questions, please call your  primary care clinic.  If you do not have a primary care provider, please call [phone] and they will assist you.        Care EveryWhere ID     This is your Care EveryWhere ID. This could be used by other organizations to access your [facility] medical records  [id number]        Your Vitals Were     Pulse Temperature Respirations Last Period Pulse Oximetry BMI (Body Mass Index)    87 97.9  F (36.6  C) (Oral) 20 04/11/2000 97% 42.05 kg/m2       Blood Pressure from Last 3 Encounters:   02/06/18 128/86   01/11/18 122/85   05/30/17 108/68    Weight from Last 3 Encounters:   02/06/18 237 lb 6.4 oz (107.7 kg)   01/11/18 235 lb (106.6 kg)   05/30/17 251 lb 3.2 oz (113.9 kg)              We Performed the Following     Albumin Random Urine Quantitative with Creat Ratio     Basic metabolic panel     CBC with platelets     FLU VACCINE, INCREASED ANTIGEN, PRESV FREE, AGE 65+ [63576]     HEMOGLOBIN A1C     Pneumococcal vaccine 13 valent PCV13 IM (Prevnar) [83577]     TSH with free T4 reflex          Today's Medication Changes          These changes are accurate as of 2/6/18 10:12 AM.  If you have any questions, ask your nurse or doctor.               These medicines have changed or have updated prescriptions.        Dose/Directions    metFORMIN 500 MG tablet   Commonly known as:  GLUCOPHAGE   This may have changed:  Another medication with the same name was removed. Continue taking this medication, and follow the directions you see here.   Used for:  Type 2 diabetes mellitus with hyperglycemia, without long-term current use of insulin (H)   Changed by:  Virgil Goodson MD        TAKE ONE TABLET (500MG) BY MOUTH TWICE DAILY WITH MEALS   Quantity:  60 tablet   Refills:  1         Stop taking these medicines if you haven't already. Please contact your care team if you have questions.     5-HTP PO   Stopped by:  Virgil Goodson MD           oxyCODONE IR 5 MG tablet   Commonly known as:  ROXICODONE   Stopped by:  Kellee  Virgil RICE MD           predniSONE 2.5 MG tablet   Commonly known as:  DELTASONE   Stopped by:  Virgil Goodson MD                    Primary Care Provider Office Phone # Fax #    Virgil Goodson [phone] [address] [facility] [address]        Equal Access to Services     [facility]: Hadii aad ku hadasho Soomaali, waaxda luqadaha, qaybta kaalmada adeegyada, waxay idiin hayaan adeeg kharash laNandoaan . So [facility] [phone].    ATENCIÓN: Si habla español, tiene a myers disposición servicios gratuitos de asistencia lingüística. Llame al [phone].    We comply with applicable federal civil rights laws and [place] laws. We do not discriminate on the basis of race, color, national origin, age, disability, sex, sexual orientation, or gender identity.            Thank you!     Thank you for choosing [facility]  for your care. Our goal is always to provide you with excellent care. Hearing back from our patients is one way we can continue to improve our services. Please take a few minutes to complete the written survey that you may receive in the mail after your visit with us. Thank you!             Your Updated Medication List - Protect others around you: Learn how to safely use, store and throw away your medicines at www.disposemymeds.org.          This list is accurate as of 2/6/18 10:12 AM.  Always use your most recent med list.                   Brand Name Dispense Instructions for use Diagnosis    ASPIRIN PO      Take 81 mg by mouth        atorvastatin 20 MG tablet    LIPITOR    90 tablet    TAKE 1 TABLET AT BEDTIME    Type 2 diabetes mellitus with hyperglycemia, without long-term current use of insulin (H)       blood glucose monitoring lancets     1 Box    Use to test blood sugars 1 times daily    Type 2 diabetes mellitus with hyperglycemia, without long-term current use of insulin (H)       blood glucose monitoring meter device kit     1 kit     Use to test blood sugars 1x times daily    Type 2 diabetes mellitus with hyperglycemia, without long-term current use of insulin (H)       blood glucose monitoring test strip    ONETOUCH VERIO IQ    100 strip    Use to test blood sugars 1x times daily    Type 2 diabetes mellitus with hyperglycemia, without long-term current use of insulin (H)       calcium carbonate-Vit D-Min 600-400 MG-UNIT per tablet     100 tablet    Take 1 tablet by mouth daily. FOR BONE HEALTH        FISH OIL PO      Take  by mouth.        ibuprofen 200 MG tablet    ADVIL/MOTRIN     Take 200 mg by mouth every 4 hours as needed for mild pain Reported on 4/19/2017        L-Tryptophan 500 MG Caps      Take by mouth daily        lisinopril-hydrochlorothiazide 20-12.5 MG per tablet    PRINZIDE/ZESTORETIC    30 tablet    Take 1 tablet by mouth daily    Essential hypertension       MELATONIN PO      Take  by mouth.        metFORMIN 500 MG tablet    GLUCOPHAGE    60 tablet    TAKE ONE TABLET (500MG) BY MOUTH TWICE DAILY WITH MEALS    Type 2 diabetes mellitus with hyperglycemia, without long-term current use of insulin (H)       MULTIVITAMIN PO      None Entered        OTEZLA PO      Take by mouth daily        VITAMIN B-1 PO      Take  by mouth.        VITAMIN C PO      Take  by mouth.

## 2018-02-06 NOTE — NURSING NOTE
"Chief Complaint   Patient presents with     Diabetes     FASTING, F/U     Hypertension       Initial /86 (BP Location: Left arm, Patient Position: Sitting, Cuff Size: Adult Large)  Pulse 87  Temp 97.9  F (36.6  C) (Oral)  Resp 20  Wt 237 lb 6.4 oz (107.7 kg)  LMP 04/11/2000  SpO2 97%  BMI 42.05 kg/m2 Estimated body mass index is 42.05 kg/(m^2) as calculated from the following:    Height as of 1/11/18: 5' 3\" (1.6 m).    Weight as of this encounter: 237 lb 6.4 oz (107.7 kg).  Medication Reconciliation: complete    Health Maintenance Due   Topic Date Due     MICROALBUMIN Q1 YEAR  10/26/1951     MAMMO SCREEN Q2 YR (SYSTEM ASSIGNED)  10/26/1998     CMP Q1 YR  03/05/2013     EYE EXAM Q1 YEAR  07/16/2013     PNEUMOCOCCAL (1 of 2 - PCV13) 10/26/2013     ADVANCE DIRECTIVE PLANNING Q5 YRS  12/08/2016     INFLUENZA VACCINE (SYSTEM ASSIGNED)  09/01/2017     A1C Q6 MO  10/19/2017     Reviewed overdue health maintenance topics with patient.      Screening Questionnaire for Adult Immunization    Are you sick today?   No   Do you have allergies to medications, food, a vaccine component or latex?   Yes   Have you ever had a serious reaction after receiving a vaccination?   No   Do you have a long-term health problem with heart disease, lung disease, asthma, kidney disease, metabolic disease (e.g. diabetes), anemia, or other blood disorder?   Yes   Do you have cancer, leukemia, HIV/AIDS, or any other immune system problem?   No   In the past 3 months, have you taken medications that affect  your immune system, such as prednisone, other steroids, or anticancer drugs; drugs for the treatment of rheumatoid arthritis, Crohn s disease, or psoriasis; or have you had radiation treatments?   Yes   Have you had a seizure, or a brain or other nervous system problem?   No   During the past year, have you received a transfusion of blood or blood     products, or been given immune (gamma) globulin or antiviral drug?   No   For women: " Are you pregnant or is there a chance you could become        pregnant during the next month?   No   Have you received any vaccinations in the past 4 weeks?   No     Immunization questionnaire was positive for at least one answer.  Notified provider.        Per orders of Dr. Goodson, injection of Prevnar 13 given by Princess DAVID Herzog. Patient instructed to remain in clinic for 15 minutes afterwards, and to report any adverse reaction to me immediately.       Screening performed by Princess DAVID Herzog on 2/6/2018 at 9:40 AM.    Injectable Influenza Immunization Documentation    1.  Are you sick today? (Fever of 100.5 or higher on the day of the clinic)   No    2.  Have you ever had Guillain-[place] Syndrome within 6 weeks of an influenza vaccionation?  No    3. Do you have a life-threatening allergy to eggs?  No    4. Do you have a life-threatening allergy to a component of the vaccine? May include antibiotics, gelatin or latex.  YES allergic to latex but mild     5. Have you ever had a reaction to a dose of flu vaccine that needed immediate medical attention?  No     Form completed by Princess DAVID Herzog, [facility]

## 2018-02-06 NOTE — PROGRESS NOTES
SUBJECTIVE:   Josefina Maldonado is a 67 year old female who presents to clinic today for the following health issues:    Here today with .    Diabetes Follow-up         Patient is checking blood sugars: 3 times a week        Diabetic concerns: None     Symptoms of hypoglycemia (low blood sugar): none     Paresthesias (numbness or burning in feet) or sores: Yes has arthritis     Date of last diabetic eye exam: 2010, overdue    BP Readings from Last 2 Encounters:   02/06/18 122/70   01/11/18 122/85     Hemoglobin A1C (%)   Date Value   04/19/2017 7.4 (H)   04/11/2003 6.3 (H)     LDL Cholesterol Calculated (mg/dL)   Date Value   04/19/2017 94   04/11/2003 109     LDL Cholesterol Direct (mg/dL)   Date Value   03/05/2012 123     Hypertension Follow-up  BP Readings from Last 3 Encounters:   02/06/18 122/70   01/11/18 122/85   05/30/17 108/68        Outpatient blood pressures are not being checked.    Low Salt Diet: not monitoring salt      Amount of exercise or physical activity: None    Problems taking medications regularly: No    Medication side effects: none    Diet: diabetic    Problem list and histories reviewed & adjusted, as indicated.  Additional history: as documented    Labs reviewed in EPIC    Reviewed and updated as needed this visit by clinical staff  Tobacco  Allergies  Meds  Med Hx  Surg Hx  Fam Hx  Soc Hx      Reviewed and updated as needed this visit by Provider         ROS:  Constitutional, HEENT, cardiovascular, pulmonary, gi and gu systems are negative, except as otherwise noted.    OBJECTIVE:                                                    /70  Pulse 87  Temp 97.9  F (36.6  C) (Oral)  Resp 20  Wt 237 lb 6.4 oz (107.7 kg)  LMP 04/11/2000  SpO2 97%  BMI 42.05 kg/m2  Body mass index is 42.05 kg/(m^2).  GENERAL APPEARANCE: alert, mild distress and over weight  HENT: nose and mouth without ulcers or lesions and normal cephalic/atraumatic  NECK: no adenopathy, no asymmetry,  masses, or scars and thyroid normal to palpation  RESP: lungs clear to auscultation - no rales, rhonchi or wheezes  CV: regular rates and rhythm, normal S1 S2, no S3 or S4 and no murmur, click or rub  MS: R arm in sling  SKIN: psoriatic plaques on plantar surface feet  NEURO: Normal strength and tone, mentation intact and speech normal  DIABETIC FOOT EXAM: normal DP and PT pulses, no trophic changes or ulcerative lesions and normal monofilament exam    Diagnostic test results:  Diagnostic Test Results:  Results for orders placed or performed in visit on 02/06/18 (from the past 24 hour(s))   HEMOGLOBIN A1C   Result Value Ref Range    Hemoglobin A1C 5.6 4.3 - 6.0 %   Basic metabolic panel   Result Value Ref Range    Sodium 140 133 - 144 mmol/L    Potassium 4.2 3.4 - 5.3 mmol/L    Chloride 104 94 - 109 mmol/L    Carbon Dioxide 29 20 - 32 mmol/L    Anion Gap 7 3 - 14 mmol/L    Glucose 102 (H) 70 - 99 mg/dL    Urea Nitrogen 22 7 - 30 mg/dL    Creatinine 0.73 0.52 - 1.04 mg/dL    GFR Estimate 79 >60 mL/min/1.7m2    GFR Estimate If Black >90 >60 mL/min/1.7m2    Calcium 9.7 8.5 - 10.1 mg/dL   CBC with platelets   Result Value Ref Range    WBC 11.6 (H) 4.0 - 11.0 10e9/L    RBC Count 4.35 3.8 - 5.2 10e12/L    Hemoglobin 13.1 11.7 - 15.7 g/dL    Hematocrit 42.0 35.0 - 47.0 %    MCV 97 78 - 100 fl    MCH 30.1 26.5 - 33.0 pg    MCHC 31.2 (L) 31.5 - 36.5 g/dL    RDW 14.8 10.0 - 15.0 %    Platelet Count 372 150 - 450 10e9/L   TSH with free T4 reflex   Result Value Ref Range    TSH 0.82 0.40 - 4.00 mU/L        ASSESSMENT/PLAN:                                                    1. Type 2 diabetes mellitus with hyperglycemia, without long-term current use of insulin (H)  Quite good- continue present medication regimen  - HEMOGLOBIN A1C  - Albumin Random Urine Quantitative with Creat Ratio  - Basic metabolic panel    2. Low TSH level  Repeat TSH normal.   - TSH with free T4 reflex    3. Leukocytosis, unspecified type  Minimal elev-  will monitor  - CBC with platelets    4. Need for prophylactic vaccination and inoculation against influenza  - FLU VACCINE, INCREASED ANTIGEN, PRESV FREE, AGE 65+ [69034]    5. Need for prophylactic vaccination against Streptococcus pneumoniae (pneumococcus)  - Pneumococcal vaccine 13 valent PCV13 IM (Prevnar) [01614]    7. Visit for screening mammogram  - MA SCREENING DIGITAL BILAT - Future  (s+30); Future    8. Closed fracture of head of right humerus, initial encounter  Per ortho. Pain mgmt    Virgil Goodson MD  [facility]

## 2018-02-07 ASSESSMENT — ANXIETY QUESTIONNAIRES: GAD7 TOTAL SCORE: 6

## 2018-02-07 ASSESSMENT — PATIENT HEALTH QUESTIONNAIRE - PHQ9: SUM OF ALL RESPONSES TO PHQ QUESTIONS 1-9: 18

## 2018-03-12 ENCOUNTER — TRANSFERRED RECORDS (OUTPATIENT)
Dept: HEALTH INFORMATION MANAGEMENT | Facility: CLINIC | Age: 68
End: 2018-03-12

## 2018-03-12 LAB
ALT SERPL-CCNC: 25 IU/L (ref 5–35)
AST SERPL-CCNC: 22 U/L (ref 5–34)
CREAT SERPL-MCNC: 0.66 MG/DL (ref 0.5–1.3)
GFR SERPL CREATININE-BSD FRML MDRD: 94.4 ML/MIN/1.73M2

## 2018-06-12 DIAGNOSIS — E11.65 TYPE 2 DIABETES MELLITUS WITH HYPERGLYCEMIA, WITHOUT LONG-TERM CURRENT USE OF INSULIN (H): ICD-10-CM

## 2018-06-12 RX ORDER — BLOOD SUGAR DIAGNOSTIC
STRIP MISCELLANEOUS
Qty: 100 EACH | Refills: 0 | Status: SHIPPED | OUTPATIENT
Start: 2018-06-12 | End: 2018-06-23

## 2018-06-12 RX ORDER — ATORVASTATIN CALCIUM 20 MG/1
TABLET, FILM COATED ORAL
Qty: 30 TABLET | Refills: 0 | Status: SHIPPED | OUTPATIENT
Start: 2018-06-12 | End: 2018-07-24

## 2018-06-12 NOTE — TELEPHONE ENCOUNTER
"Requested Prescriptions   Pending Prescriptions Disp Refills     ONETOUCH VERIO IQ test strip [Pharmacy Med Name: ONE TOUCH VERIO STRIPS 100'S]  3      Last Written Prescription Date:  07/10/17  Last Fill Quantity: 100,  # refills: 3   Last office visit: 2/6/2018 with prescribing provider:  02/06/18   Future Office Visit:  0 Sig: USE TO TEST BLOOD SUGARS ONE TIME DAILY    Diabetic Supplies Protocol Passed    6/12/2018 12:36 AM       Passed - Patient is 18 years of age or older       Passed - Recent (6 mo) or future (30 days) visit within the authorizing provider's specialty    Patient had office visit in the last 6 months or has a visit in the next 30 days with authorizing provider.  See \"Patient Info\" tab in inbasket, or \"Choose Columns\" in Meds & Orders section of the refill encounter.            atorvastatin (LIPITOR) 20 MG tablet [Pharmacy Med Name: ATORVASTATIN TABS 20MG] 90 tablet 2      Last Written Prescription Date:  10/03/17  Last Fill Quantity: 90,  # refills: 2   Last office visit: 2/6/2018 with prescribing provider:  02/06/18   Future Office Visit: 0  Sig: TAKE 1 TABLET AT BEDTIME    Statins Protocol Failed    6/12/2018 12:36 AM       Failed - LDL on file in past 12 months    Recent Labs   Lab Test  04/19/17   1010   LDL  94            Passed - No abnormal creatine kinase in past 12 months    No lab results found.            Passed - Recent (12 mo) or future (30 days) visit within the authorizing provider's specialty    Patient had office visit in the last 12 months or has a visit in the next 30 days with authorizing provider or within the authorizing provider's specialty.  See \"Patient Info\" tab in inbasket, or \"Choose Columns\" in Meds & Orders section of the refill encounter.           Passed - Patient is age 18 or older       Passed - No active pregnancy on record       Passed - No positive pregnancy test in past 12 months        "

## 2018-06-22 DIAGNOSIS — E11.65 TYPE 2 DIABETES MELLITUS WITH HYPERGLYCEMIA, WITHOUT LONG-TERM CURRENT USE OF INSULIN (H): ICD-10-CM

## 2018-06-22 DIAGNOSIS — I10 ESSENTIAL HYPERTENSION: ICD-10-CM

## 2018-06-23 DIAGNOSIS — E11.65 TYPE 2 DIABETES MELLITUS WITH HYPERGLYCEMIA, WITHOUT LONG-TERM CURRENT USE OF INSULIN (H): ICD-10-CM

## 2018-06-23 NOTE — TELEPHONE ENCOUNTER
"Requested Prescriptions   Pending Prescriptions Disp Refills     blood glucose monitoring (ONETOUCH VERIO IQ) test strip  Last Written Prescription Date:  6/12/18  Last Fill Quantity: 100 EACH,  # refills: 0   Last office visit: 2/6/2018 with prescribing provider:  MATI   Future Office Visit:     100 each 0     Sig: Use to test blood sugar ONE times daily or as directed.    Diabetic Supplies Protocol Passed    6/23/2018  5:53 PM       Passed - Patient is 18 years of age or older       Passed - Recent (6 mo) or future (30 days) visit within the authorizing provider's specialty    Patient had office visit in the last 6 months or has a visit in the next 30 days with authorizing provider.  See \"Patient Info\" tab in inbasket, or \"Choose Columns\" in Meds & Orders section of the refill encounter.            atorvastatin (LIPITOR) 20 MG tablet  PATIENT NEEDS LIPIDS LABS.  Last Written Prescription Date:  6/12/18  Last Fill Quantity: 30 TABLET,  # refills: 0   Last office visit: 2/6/2018 with prescribing provider:  RIETHOF   Future Office Visit:     30 tablet 0     Sig: Take 1 tablet (20 mg) by mouth At Bedtime    Statins Protocol Failed    6/23/2018  5:53 PM       Failed - LDL on file in past 12 months    Recent Labs   Lab Test  04/19/17   1010   LDL  94            Passed - No abnormal creatine kinase in past 12 months    No lab results found.            Passed - Recent (12 mo) or future (30 days) visit within the authorizing provider's specialty    Patient had office visit in the last 12 months or has a visit in the next 30 days with authorizing provider or within the authorizing provider's specialty.  See \"Patient Info\" tab in inbasket, or \"Choose Columns\" in Meds & Orders section of the refill encounter.           Passed - Patient is age 18 or older       Passed - No active pregnancy on record       Passed - No positive pregnancy test in past 12 months          "

## 2018-06-23 NOTE — TELEPHONE ENCOUNTER
"Requested Prescriptions   Pending Prescriptions Disp Refills     metFORMIN (GLUCOPHAGE) 500 MG tablet 180 tablet 1     Sig: TAKE ONE TABLET (500MG) BY MOUTH TWICE DAILY WITH MEALS    Biguanide Agents Failed    6/22/2018  8:02 PM       Failed - Patient has documented LDL within the past 12 mos.    Recent Labs   Lab Test  04/19/17   1010   LDL  94            Failed - Patient has had a Microalbumin in the past 12 mos.    No lab results found.         Passed - Blood pressure less than 140/90 in past 6 months    BP Readings from Last 3 Encounters:   02/06/18 122/70   01/11/18 122/85   05/30/17 108/68                Passed - Patient is age 10 or older       Passed - Patient has documented A1c within the specified period of time.    If HgbA1C is 8 or greater, it needs to be on file within the past 3 months.  If less than 8, must be on file within the past 6 months.     Recent Labs   Lab Test  02/06/18   1025   A1C  5.6            Passed - Patient's CR is NOT>1.4 OR Patient's EGFR is NOT<45 within past 12 mos.    Recent Labs   Lab Test 03/12/18 02/06/18   1025   GFRESTIMATED  94.4  79   GFRESTBLACK   --   >90       Recent Labs   Lab Test 03/12/18   CR  0.660            Passed - Patient does NOT have a diagnosis of CHF.       Passed - Patient is not pregnant       Passed - Patient has not had a positive pregnancy test within the past 12 mos.        Passed - Recent (6 mo) or future (30 days) visit within the authorizing provider's specialty    Patient had office visit in the last 6 months or has a visit in the next 30 days with authorizing provider or within the authorizing provider's specialty.  See \"Patient Info\" tab in inbasket, or \"Choose Columns\" in Meds & Orders section of the refill encounter.       Last Written Prescription Date:  02/06/2018  Last Fill Quantity: 180,  # refills: 01   Last office visit: 2/6/2018 with prescribing provider:  02/06/2018   Future Office Visit:           "

## 2018-06-23 NOTE — TELEPHONE ENCOUNTER
"Requested Prescriptions   Pending Prescriptions Disp Refills     blood glucose monitoring (ONETOUCH VERIO) meter device kit 1 kit 0     Sig: Use to test blood sugars 1x times daily    Diabetic Supplies Protocol Passed    6/22/2018  8:19 PM       Passed - Patient is 18 years of age or older       Passed - Recent (6 mo) or future (30 days) visit within the authorizing provider's specialty    Patient had office visit in the last 6 months or has a visit in the next 30 days with authorizing provider.  See \"Patient Info\" tab in inbasket, or \"Choose Columns\" in Meds & Orders section of the refill encounter.       Last Written Prescription Date:  04/21/2017  Last Fill Quantity: 1 kit,  # refills: 0   Last office visit: 2/6/2018 with prescribing provider:  02/06/2018   Future Office Visit:           "

## 2018-06-25 RX ORDER — BLOOD-GLUCOSE METER
EACH MISCELLANEOUS
Qty: 1 KIT | Refills: 0 | Status: SHIPPED | OUTPATIENT
Start: 2018-06-25 | End: 2018-08-13

## 2018-06-25 RX ORDER — LISINOPRIL AND HYDROCHLOROTHIAZIDE 12.5; 2 MG/1; MG/1
1 TABLET ORAL DAILY
Qty: 90 TABLET | Refills: 2 | Status: SHIPPED | OUTPATIENT
Start: 2018-06-25 | End: 2019-03-28

## 2018-06-25 RX ORDER — ATORVASTATIN CALCIUM 20 MG/1
20 TABLET, FILM COATED ORAL AT BEDTIME
Qty: 30 TABLET | Refills: 0 | OUTPATIENT
Start: 2018-06-25

## 2018-06-25 NOTE — TELEPHONE ENCOUNTER
Medication is being filled for 1 time refill only due to:  Patient needs labs and reminder letter sent 6/12/18.

## 2018-06-27 DIAGNOSIS — E11.65 TYPE 2 DIABETES MELLITUS WITH HYPERGLYCEMIA, WITHOUT LONG-TERM CURRENT USE OF INSULIN (H): ICD-10-CM

## 2018-06-27 NOTE — TELEPHONE ENCOUNTER
Routing refill request to provider for review/approval because:  Labs not current:  LDL  No microalbumin on file

## 2018-06-27 NOTE — TELEPHONE ENCOUNTER
"Requested Prescriptions   Pending Prescriptions Disp Refills     metFORMIN (GLUCOPHAGE) 500 MG tablet [Pharmacy Med Name: METFORMIN  MG Tablet] 60 tablet 0     Sig: TAKE ONE TABLET (500MG) BY MOUTH TWICE DAILY WITH MEALS (NEED LABS)    Biguanide Agents Failed    6/27/2018  1:31 PM       Failed - Patient has documented LDL within the past 12 mos.    Recent Labs   Lab Test  04/19/17   1010   LDL  94            Failed - Patient has had a Microalbumin in the past 12 mos.    No lab results found.         Passed - Blood pressure less than 140/90 in past 6 months    BP Readings from Last 3 Encounters:   02/06/18 122/70   01/11/18 122/85   05/30/17 108/68                Passed - Patient is age 10 or older       Passed - Patient has documented A1c within the specified period of time.    If HgbA1C is 8 or greater, it needs to be on file within the past 3 months.  If less than 8, must be on file within the past 6 months.     Recent Labs   Lab Test  02/06/18   1025   A1C  5.6            Passed - Patient's CR is NOT>1.4 OR Patient's EGFR is NOT<45 within past 12 mos.    Recent Labs   Lab Test 03/12/18 02/06/18   1025   GFRESTIMATED  94.4  79   GFRESTBLACK   --   >90       Recent Labs   Lab Test 03/12/18   CR  0.660            Passed - Patient does NOT have a diagnosis of CHF.       Passed - Patient is not pregnant       Passed - Patient has not had a positive pregnancy test within the past 12 mos.        Passed - Recent (6 mo) or future (30 days) visit within the authorizing provider's specialty    Patient had office visit in the last 6 months or has a visit in the next 30 days with authorizing provider or within the authorizing provider's specialty.  See \"Patient Info\" tab in inbasket, or \"Choose Columns\" in Meds & Orders section of the refill encounter.              "

## 2018-07-07 DIAGNOSIS — E11.65 TYPE 2 DIABETES MELLITUS WITH HYPERGLYCEMIA, WITHOUT LONG-TERM CURRENT USE OF INSULIN (H): ICD-10-CM

## 2018-07-07 NOTE — TELEPHONE ENCOUNTER
MESSAGE: PATIENT REQUESTED WE CONTACT YOU TO HAVE THEIR PRESCRIPTION(S) FILLED FOR A 90- DAY SUPPLY. FAX NEW RX FOR blood glucose monitoring (ONETOUCH VERIO IQ) test strip -[phone].

## 2018-07-09 DIAGNOSIS — E11.65 TYPE 2 DIABETES MELLITUS WITH HYPERGLYCEMIA, WITHOUT LONG-TERM CURRENT USE OF INSULIN (H): ICD-10-CM

## 2018-07-09 NOTE — LETTER
[facility]  [address] [address]  [phone]            Josefina Maldonado  [address]  [facility] [address]        July 11, 2018    Dear Josefina,    While refilling your atorvastatin (LIPITOR) 20 MG tablet prescription today, we noticed that you are due to have labs drawn and see your provider.  We will refill your prescription for 30 days, but a follow-up appointment must be made before any additional refills can be approved.     Taking care of your health is important to us and we look forward to seeing you in the near future.  Please call us at [phone] or [phone] (or use iDubba) to schedule an appointment.     Please disregard this notice if you have already made an appointment.    Sincerely,        [facility]

## 2018-07-10 RX ORDER — ATORVASTATIN CALCIUM 20 MG/1
TABLET, FILM COATED ORAL
Qty: 30 TABLET | Refills: 1 | Status: SHIPPED | OUTPATIENT
Start: 2018-07-10 | End: 2018-08-13

## 2018-07-10 NOTE — TELEPHONE ENCOUNTER
Call pt- 30 d fill approved.  F/u labd and visit in august needed before further refills approved.

## 2018-07-10 NOTE — TELEPHONE ENCOUNTER
Routing refill request to provider for review/approval because:  Roberta given x1 and patient did not follow up, please advise  Labs not current:  lipids

## 2018-07-10 NOTE — TELEPHONE ENCOUNTER
"Requested Prescriptions   Pending Prescriptions Disp Refills     atorvastatin (LIPITOR) 20 MG tablet [Pharmacy Med Name: ATORVASTATIN 20MG   TAB] 30 tablet 5      Last Written Prescription Date:  06/12/18  Last Fill Quantity: 30,  # refills: 0   Last office visit: 2/6/2018 with prescribing provider:  02/06/18   Future Office Visit:  0 Sig: TAKE ONE TABLET (20MG) BY MOUTH ONCE DAILY AT BEDTIME    Statins Protocol Failed    7/9/2018 10:40 AM       Failed - LDL on file in past 12 months    Recent Labs   Lab Test  04/19/17   1010   LDL  94            Passed - No abnormal creatine kinase in past 12 months    No lab results found.            Passed - Recent (12 mo) or future (30 days) visit within the authorizing provider's specialty    Patient had office visit in the last 12 months or has a visit in the next 30 days with authorizing provider or within the authorizing provider's specialty.  See \"Patient Info\" tab in inbasket, or \"Choose Columns\" in Meds & Orders section of the refill encounter.           Passed - Patient is age 18 or older       Passed - No active pregnancy on record       Passed - No positive pregnancy test in past 12 months        "

## 2018-07-16 ENCOUNTER — TRANSFERRED RECORDS (OUTPATIENT)
Dept: HEALTH INFORMATION MANAGEMENT | Facility: CLINIC | Age: 68
End: 2018-07-16

## 2018-07-24 ENCOUNTER — RADIANT APPOINTMENT (OUTPATIENT)
Dept: GENERAL RADIOLOGY | Facility: CLINIC | Age: 68
End: 2018-07-24
Attending: FAMILY MEDICINE
Payer: COMMERCIAL

## 2018-07-24 ENCOUNTER — OFFICE VISIT (OUTPATIENT)
Dept: URGENT CARE | Facility: URGENT CARE | Age: 68
End: 2018-07-24
Payer: COMMERCIAL

## 2018-07-24 VITALS
TEMPERATURE: 98.3 F | WEIGHT: 261.06 LBS | OXYGEN SATURATION: 95 % | SYSTOLIC BLOOD PRESSURE: 139 MMHG | DIASTOLIC BLOOD PRESSURE: 82 MMHG | BODY MASS INDEX: 46.25 KG/M2 | HEART RATE: 91 BPM

## 2018-07-24 DIAGNOSIS — R42 DIZZINESS: Primary | ICD-10-CM

## 2018-07-24 DIAGNOSIS — R42 DIZZINESS: ICD-10-CM

## 2018-07-24 LAB
ANION GAP SERPL CALCULATED.3IONS-SCNC: 9 MMOL/L (ref 3–14)
BUN SERPL-MCNC: 27 MG/DL (ref 7–30)
CALCIUM SERPL-MCNC: 9.3 MG/DL (ref 8.5–10.1)
CHLORIDE SERPL-SCNC: 105 MMOL/L (ref 94–109)
CO2 SERPL-SCNC: 26 MMOL/L (ref 20–32)
CREAT SERPL-MCNC: 0.81 MG/DL (ref 0.52–1.04)
GFR SERPL CREATININE-BSD FRML MDRD: 70 ML/MIN/1.7M2
GLUCOSE SERPL-MCNC: 206 MG/DL (ref 70–99)
POTASSIUM SERPL-SCNC: 3.8 MMOL/L (ref 3.4–5.3)
SODIUM SERPL-SCNC: 140 MMOL/L (ref 133–144)

## 2018-07-24 PROCEDURE — 36415 COLL VENOUS BLD VENIPUNCTURE: CPT | Performed by: FAMILY MEDICINE

## 2018-07-24 PROCEDURE — 80048 BASIC METABOLIC PNL TOTAL CA: CPT | Performed by: FAMILY MEDICINE

## 2018-07-24 PROCEDURE — 99214 OFFICE O/P EST MOD 30 MIN: CPT | Performed by: FAMILY MEDICINE

## 2018-07-24 PROCEDURE — 71046 X-RAY EXAM CHEST 2 VIEWS: CPT | Mod: FY

## 2018-07-24 RX ORDER — LORAZEPAM 0.5 MG/1
0.5 TABLET ORAL EVERY 8 HOURS PRN
Qty: 20 TABLET | Refills: 0 | Status: SHIPPED | OUTPATIENT
Start: 2018-07-24 | End: 2020-01-07

## 2018-07-24 RX ORDER — CLOBETASOL PROPIONATE 0.5 MG/G
OINTMENT TOPICAL
COMMUNITY
Start: 2017-06-10 | End: 2024-06-20

## 2018-07-24 NOTE — MR AVS SNAPSHOT
After Visit Summary   7/24/2018    Josefina Maldonado    MRN: [id number]           Patient Information     Date Of Birth          1950        Visit Information        Provider Department      7/24/2018 4:10 PM Bimal Conner MD [facility]        Today's Diagnoses     Dizziness    -  1       Follow-ups after your visit        Who to contact     If you have questions or need follow up information about today's clinic visit or your schedule please contact [facility] directly at [phone].  Normal or non-critical lab and imaging results will be communicated to you by [employer] A[employer]art, letter or phone within 4 business days after the clinic has received the results. If you do not hear from us within 7 days, please contact the clinic through GoWorkaBitt or phone. If you have a critical or abnormal lab result, we will notify you by phone as soon as possible.  Submit refill requests through [employer] or call your pharmacy and they will forward the refill request to us. Please allow 3 business days for your refill to be completed.          Additional Information About Your Visit        MyChart Information     [employer] gives you secure access to your electronic health record. If you see a primary care provider, you can also send messages to your care team and make appointments. If you have questions, please call your primary care clinic.  If you do not have a primary care provider, please call [phone] and they will assist you.        Care EveryWhere ID     This is your Care EveryWhere ID. This could be used by other organizations to access your [facility] medical records  [id number]        Your Vitals Were     Pulse Temperature Last Period Pulse Oximetry Breastfeeding? BMI (Body Mass Index)    91 98.3  F (36.8  C) (Oral) 04/11/2000 95% No 46.25 kg/m2       Blood Pressure from Last 3 Encounters:   07/24/18 139/82   02/06/18 122/70   01/11/18 122/85    Weight  from Last 3 Encounters:   07/24/18 261 lb 1 oz (118.4 kg)   02/06/18 237 lb 6.4 oz (107.7 kg)   01/11/18 235 lb (106.6 kg)              We Performed the Following     Basic metabolic panel          Today's Medication Changes          These changes are accurate as of 7/24/18 11:59 PM.  If you have any questions, ask your nurse or doctor.               Start taking these medicines.        Dose/Directions    LORazepam 0.5 MG tablet   Commonly known as:  ATIVAN   Used for:  Dizziness   Started by:  Bimal Conner MD        Dose:  0.5 mg   Take 1 tablet (0.5 mg) by mouth every 8 hours as needed for anxiety   Quantity:  20 tablet   Refills:  0         These medicines have changed or have updated prescriptions.        Dose/Directions    atorvastatin 20 MG tablet   Commonly known as:  LIPITOR   This may have changed:  Another medication with the same name was removed. Continue taking this medication, and follow the directions you see here.   Used for:  Type 2 diabetes mellitus with hyperglycemia, without long-term current use of insulin (H)   Changed by:  Bimal Conner MD        TAKE ONE TABLET (20MG) BY MOUTH ONCE DAILY AT BEDTIME   Quantity:  30 tablet   Refills:  1            Where to get your medicines      Some of these will need a paper prescription and others can be bought over the counter.  Ask your nurse if you have questions.     Bring a paper prescription for each of these medications     LORazepam 0.5 MG tablet                Primary Care Provider Office Phone # Fax #    Virgil Goodson [phone]       [address] [place] [address]        Equal Access to Services     [facility] AH: Hadii justin harveyo Solia, waaxda luqadaha, qaybta kaalmada adeegyada, torrey wilhelm. So [facility] [phone].    ATENCIÓN: Si habla español, tiene a myers disposición servicios gratuitos de asistencia lingüística. Llame al [phone].    We comply with applicable  federal civil rights laws and [place] laws. We do not discriminate on the basis of race, color, national origin, age, disability, sex, sexual orientation, or gender identity.            Thank you!     Thank you for choosing [facility]  for your care. Our goal is always to provide you with excellent care. Hearing back from our patients is one way we can continue to improve our services. Please take a few minutes to complete the written survey that you may receive in the mail after your visit with us. Thank you!             Your Updated Medication List - Protect others around you: Learn how to safely use, store and throw away your medicines at www.disposemymeds.org.          This list is accurate as of 7/24/18 11:59 PM.  Always use your most recent med list.                   Brand Name Dispense Instructions for use Diagnosis    ASPIRIN PO      Take 81 mg by mouth        atorvastatin 20 MG tablet    LIPITOR    30 tablet    TAKE ONE TABLET (20MG) BY MOUTH ONCE DAILY AT BEDTIME    Type 2 diabetes mellitus with hyperglycemia, without long-term current use of insulin (H)       blood glucose monitoring lancets     1 Box    Use to test blood sugars 1 times daily    Type 2 diabetes mellitus with hyperglycemia, without long-term current use of insulin (H)       blood glucose monitoring meter device kit     1 kit    Use to test blood sugars 1x times daily    Type 2 diabetes mellitus with hyperglycemia, without long-term current use of insulin (H)       blood glucose monitoring test strip    ONETOUCH VERIO IQ    100 each    USE TO TEST BLOOD SUGARS ONE TIME DAILY    Type 2 diabetes mellitus with hyperglycemia, without long-term current use of insulin (H)       calcium carbonate-Vit D-Min 600-400 MG-UNIT per tablet     100 tablet    Take 1 tablet by mouth daily. FOR BONE HEALTH        clobetasol 0.05 % ointment    TEMOVATE          FISH OIL PO      Take  by mouth.        ibuprofen 200 MG tablet     ADVIL/MOTRIN     Take 200 mg by mouth every 4 hours as needed for mild pain Reported on 4/19/2017        L-Tryptophan 500 MG Caps      Take by mouth daily        lisinopril-hydrochlorothiazide 20-12.5 MG per tablet    PRINZIDE/ZESTORETIC    90 tablet    Take 1 tablet by mouth daily    Essential hypertension       LORazepam 0.5 MG tablet    ATIVAN    20 tablet    Take 1 tablet (0.5 mg) by mouth every 8 hours as needed for anxiety    Dizziness       MELATONIN PO      Take  by mouth.        metFORMIN 500 MG tablet    GLUCOPHAGE    180 tablet    TAKE ONE TABLET (500MG) BY MOUTH TWICE DAILY WITH MEALS (NEED LABS)    Type 2 diabetes mellitus with hyperglycemia, without long-term current use of insulin (H)       METHOTREXATE PO      Take by mouth once a week        MULTIVITAMIN PO      None Entered        OTEZLA PO      Take by mouth daily        PREDNISONE PO      Take 10 mg by mouth daily        VITAMIN B-1 PO      Take  by mouth.        VITAMIN C PO      Take  by mouth.

## 2018-07-24 NOTE — PROGRESS NOTES
SUBJECTIVE:   Josefina Maldonado is a 67 year old female presenting with a chief complaint of   Chief Complaint   Patient presents with     Dizziness     dizziness and slight headaches that started around 12:30am this morning.       Has used a dose of meclizine and the dizziness still continues she does not have any right lateralizing symptoms that suggest a stroke she has no fever no chills.        She is an established patient of [facility].        Review of Systems   Neurological: Positive for dizziness.   All other systems reviewed and are negative.      Past Medical History:   Diagnosis Date     Attention deficit disorder 7/27/2004     Problem list name updated by automated process. Provider to review     Chronic depressive personality disorder 1966    has a therapist     Degenerative disc disease      Hypersomnia with sleep apnea, unspecified 2001    uses cpap     Hypertension      Malabsorption of glucose      (Problem list name updated by automated process. Provider to review and confirm.)     Obesity      Psoriatic arthritis (H)      Rheumatic fever without mention of heart involvement 1950     Family History   Problem Relation Age of Onset     Arthritis Mother      b: 1923 degenerative joint disease     Respiratory Mother      COPD     C.A.D. Father      d:age 73     Diabetes Father      Diabetes Brother      b:1946     Diabetes Brother      C.A.D. Brother      [facility] AT AGE 46 YRS     Current Outpatient Prescriptions   Medication Sig Dispense Refill     Ascorbic Acid (VITAMIN C PO) Take  by mouth.       ASPIRIN PO Take 81 mg by mouth        atorvastatin (LIPITOR) 20 MG tablet TAKE ONE TABLET (20MG) BY MOUTH ONCE DAILY AT BEDTIME 30 tablet 1     blood glucose monitoring (ONE TOUCH DELICA) lancets Use to test blood sugars 1 times daily 1 Box 11     blood glucose monitoring (ONETOUCH VERIO IQ) test strip USE TO TEST BLOOD SUGARS ONE TIME DAILY 100 each 0     blood glucose monitoring (ONETOUCH VERIO) meter device  kit Use to test blood sugars 1x times daily 1 kit 0     Calcium Carbonate-Vit D-Min 600-400 MG-UNIT per tablet Take 1 tablet by mouth daily. FOR BONE HEALTH 100 tablet 3     clobetasol (TEMOVATE) 0.05 % ointment        ibuprofen (ADVIL/MOTRIN) 200 MG tablet Take 200 mg by mouth every 4 hours as needed for mild pain Reported on 4/19/2017       L-Tryptophan 500 MG CAPS Take by mouth daily       lisinopril-hydrochlorothiazide (PRINZIDE/ZESTORETIC) 20-12.5 MG per tablet Take 1 tablet by mouth daily 90 tablet 2     LORazepam (ATIVAN) 0.5 MG tablet Take 1 tablet (0.5 mg) by mouth every 8 hours as needed for anxiety 20 tablet 0     metFORMIN (GLUCOPHAGE) 500 MG tablet TAKE ONE TABLET (500MG) BY MOUTH TWICE DAILY WITH MEALS (NEED LABS) 180 tablet 0     METHOTREXATE PO Take by mouth once a week       MULTIVITAMIN OR None Entered       Nutritional Supplements (MELATONIN PO) Take  by mouth.       Omega-3 Fatty Acids (FISH OIL PO) Take  by mouth.       PREDNISONE PO Take 10 mg by mouth daily       Thiamine HCl (VITAMIN B-1 PO) Take  by mouth.       Apremilast (OTEZLA PO) Take by mouth daily       [DISCONTINUED] atorvastatin (LIPITOR) 20 MG tablet TAKE 1 TABLET AT BEDTIME 30 tablet 0     Social History   Substance Use Topics     Smoking status: Current Every Day Smoker     Packs/day: 1.00     Years: 40.00     Smokeless tobacco: Never Used      Comment: 5-6 cigarettes per day     Alcohol use No       OBJECTIVE  /82  Pulse 91  Temp 98.3  F (36.8  C) (Oral)  Wt 261 lb 1 oz (118.4 kg)  LMP 04/11/2000  SpO2 95%  Breastfeeding? No  BMI 46.25 kg/m2    Physical Exam   Constitutional: She appears well-developed.   HENT:   Head: Normocephalic.   No evidence of nystagmus.   Eyes: EOM are normal.   Neck: Normal range of motion.   Cardiovascular: Normal rate.    Pulmonary/Chest: Effort normal.   Musculoskeletal: Normal range of motion.   Neurological: She is alert. She has normal reflexes.   Balance off   Skin: Skin is warm.    Psychiatric: She has a normal mood and affect.   Nursing note and vitals reviewed.      Labs:  Results for orders placed or performed in visit on 07/24/18 (from the past 24 hour(s))   Basic metabolic panel   Result Value Ref Range    Sodium 140 133 - 144 mmol/L    Potassium 3.8 3.4 - 5.3 mmol/L    Chloride 105 94 - 109 mmol/L    Carbon Dioxide 26 20 - 32 mmol/L    Anion Gap 9 3 - 14 mmol/L    Glucose 206 (H) 70 - 99 mg/dL    Urea Nitrogen 27 7 - 30 mg/dL    Creatinine 0.81 0.52 - 1.04 mg/dL    GFR Estimate 70 >60 mL/min/1.7m2    GFR Estimate If Black 84 >60 mL/min/1.7m2    Calcium 9.3 8.5 - 10.1 mg/dL       X-Ray was negative    ASSESSMENT:      ICD-10-CM    1. Dizziness R42 XR Chest 2 Views     Basic metabolic panel     LORazepam (ATIVAN) 0.5 MG tablet     CANCELED: *UA reflex to Microscopic and Culture ([facility] (except [place] and [place])      2 elevated blood sugar.     Physical examination within normal limits her neurologic exam is normal also.  She previously had a cold.  We certainly could be dealing with more of a viral labyrinthitis I certainly no evidence to suggest a bacterial.  She previously done well on meclizine not doing as well now however I would continue this medication        3.  History of middle ear infection  Medical Decision Making:    Differential Diagnosis:  Stroke, labyrinthitis, anemia, peripheral neuropathy,    Serious Comorbid Conditions:  Adult:  None    PLAN:1    1. Ativan with meclizine.  Follow-up ASAP if any lateralizing neurologic signs or symptoms any fever chills nausea vomiting or syncope    Followup:    If not improving or if condition worsens, follow up with your Primary Care Provider    There are no Patient Instructions on file for this visit.

## 2018-07-29 ASSESSMENT — ENCOUNTER SYMPTOMS: DIZZINESS: 1

## 2018-07-30 ENCOUNTER — TELEPHONE (OUTPATIENT)
Dept: INTERNAL MEDICINE | Facility: CLINIC | Age: 68
End: 2018-07-30

## 2018-07-30 DIAGNOSIS — E11.65 TYPE 2 DIABETES MELLITUS WITH HYPERGLYCEMIA, WITHOUT LONG-TERM CURRENT USE OF INSULIN (H): Primary | ICD-10-CM

## 2018-07-30 NOTE — TELEPHONE ENCOUNTER
Patients  called and asked is we could do a new script for generic test strips.  Patients insurance no longer covers what she gets now. ([employer])  Please call [facility]   Back at  [phone]

## 2018-07-31 NOTE — TELEPHONE ENCOUNTER
These messages need to be sent to the physician for refill with the order placed appropriately as a medication refill request.  Please do not directly route these to the physician without going to the nursing pool refill line thank you

## 2018-08-13 ENCOUNTER — OFFICE VISIT (OUTPATIENT)
Dept: INTERNAL MEDICINE | Facility: CLINIC | Age: 68
End: 2018-08-13
Payer: COMMERCIAL

## 2018-08-13 VITALS
SYSTOLIC BLOOD PRESSURE: 132 MMHG | WEIGHT: 258.6 LBS | HEART RATE: 90 BPM | TEMPERATURE: 98.1 F | RESPIRATION RATE: 15 BRPM | DIASTOLIC BLOOD PRESSURE: 80 MMHG | OXYGEN SATURATION: 93 % | BODY MASS INDEX: 45.81 KG/M2

## 2018-08-13 DIAGNOSIS — L40.9 PSORIASIS: ICD-10-CM

## 2018-08-13 DIAGNOSIS — L40.50 PSORIATIC ARTHRITIS (H): ICD-10-CM

## 2018-08-13 DIAGNOSIS — E11.65 TYPE 2 DIABETES MELLITUS WITH HYPERGLYCEMIA, WITHOUT LONG-TERM CURRENT USE OF INSULIN (H): Primary | ICD-10-CM

## 2018-08-13 DIAGNOSIS — E66.01 MORBID OBESITY (H): ICD-10-CM

## 2018-08-13 LAB
ALBUMIN SERPL-MCNC: 3.5 G/DL (ref 3.4–5)
ALP SERPL-CCNC: 78 U/L (ref 40–150)
ALT SERPL W P-5'-P-CCNC: 34 U/L (ref 0–50)
ANION GAP SERPL CALCULATED.3IONS-SCNC: 8 MMOL/L (ref 3–14)
AST SERPL W P-5'-P-CCNC: 32 U/L (ref 0–45)
BILIRUB SERPL-MCNC: 0.3 MG/DL (ref 0.2–1.3)
BUN SERPL-MCNC: 20 MG/DL (ref 7–30)
CALCIUM SERPL-MCNC: 9.8 MG/DL (ref 8.5–10.1)
CHLORIDE SERPL-SCNC: 104 MMOL/L (ref 94–109)
CHOLEST SERPL-MCNC: 112 MG/DL
CO2 SERPL-SCNC: 27 MMOL/L (ref 20–32)
CREAT SERPL-MCNC: 0.78 MG/DL (ref 0.52–1.04)
CREAT UR-MCNC: 123 MG/DL
GFR SERPL CREATININE-BSD FRML MDRD: 73 ML/MIN/1.7M2
GLUCOSE SERPL-MCNC: 140 MG/DL (ref 70–99)
HBA1C MFR BLD: 5.7 % (ref 0–5.6)
HDLC SERPL-MCNC: 50 MG/DL
LDLC SERPL CALC-MCNC: 37 MG/DL
MICROALBUMIN UR-MCNC: 45 MG/L
MICROALBUMIN/CREAT UR: 36.59 MG/G CR (ref 0–25)
NONHDLC SERPL-MCNC: 62 MG/DL
POTASSIUM SERPL-SCNC: 4.4 MMOL/L (ref 3.4–5.3)
PROT SERPL-MCNC: 8.2 G/DL (ref 6.8–8.8)
SODIUM SERPL-SCNC: 139 MMOL/L (ref 133–144)
TRIGL SERPL-MCNC: 126 MG/DL

## 2018-08-13 PROCEDURE — 80061 LIPID PANEL: CPT | Performed by: INTERNAL MEDICINE

## 2018-08-13 PROCEDURE — 83036 HEMOGLOBIN GLYCOSYLATED A1C: CPT | Performed by: INTERNAL MEDICINE

## 2018-08-13 PROCEDURE — 82043 UR ALBUMIN QUANTITATIVE: CPT | Performed by: INTERNAL MEDICINE

## 2018-08-13 PROCEDURE — 80053 COMPREHEN METABOLIC PANEL: CPT | Performed by: INTERNAL MEDICINE

## 2018-08-13 PROCEDURE — 99214 OFFICE O/P EST MOD 30 MIN: CPT | Performed by: INTERNAL MEDICINE

## 2018-08-13 PROCEDURE — 36415 COLL VENOUS BLD VENIPUNCTURE: CPT | Performed by: INTERNAL MEDICINE

## 2018-08-13 RX ORDER — ATORVASTATIN CALCIUM 20 MG/1
TABLET, FILM COATED ORAL
Qty: 90 TABLET | Refills: 3 | Status: SHIPPED | OUTPATIENT
Start: 2018-08-13 | End: 2018-08-15

## 2018-08-13 NOTE — MR AVS SNAPSHOT
After Visit Summary   8/13/2018    Josefina Maldonado    MRN: [id number]           Patient Information     Date Of Birth          1950        Visit Information        Provider Department      8/13/2018 7:40 AM Virgil Goodson MD [facility]        Today's Diagnoses     Type 2 diabetes mellitus with hyperglycemia, without long-term current use of insulin (H)    -  1    Morbid obesity (H)           Follow-ups after your visit        Who to contact     If you have questions or need follow up information about today's clinic visit or your schedule please contact [facility] directly at [phone].  Normal or non-critical lab and imaging results will be communicated to you by MyChart, letter or phone within 4 business days after the clinic has received the results. If you do not hear from us within 7 days, please contact the clinic through [employer] [employer] or phone. If you have a critical or abnormal lab result, we will notify you by phone as soon as possible.  Submit refill requests through [employer] or call your pharmacy and they will forward the refill request to us. Please allow 3 business days for your refill to be completed.          Additional Information About Your Visit        MyChart Information     [employer] gives you secure access to your electronic health record. If you see a primary care provider, you can also send messages to your care team and make appointments. If you have questions, please call your primary care clinic.  If you do not have a primary care provider, please call [phone] and they will assist you.        Care EveryWhere ID     This is your Care EveryWhere ID. This could be used by other organizations to access your [facility] medical records  [id number]        Your Vitals Were     Pulse Temperature Respirations Last Period Pulse Oximetry BMI (Body Mass Index)    90 98.1  F (36.7  C) (Oral) 15 04/11/2000 93% 45.81 kg/m2        Blood Pressure from Last 3 Encounters:   08/13/18 132/80   07/24/18 139/82   02/06/18 122/70    Weight from Last 3 Encounters:   08/13/18 258 lb 9.6 oz (117.3 kg)   07/24/18 261 lb 1 oz (118.4 kg)   02/06/18 237 lb 6.4 oz (107.7 kg)              We Performed the Following     Albumin Random Urine Quantitative with Creat Ratio     COMPREHENSIVE METABOLIC PANEL     HEMOGLOBIN A1C     Lipid panel reflex to direct LDL Fasting          Today's Medication Changes          These changes are accurate as of 8/13/18  8:18 AM.  If you have any questions, ask your nurse or doctor.               Stop taking these medicines if you haven't already. Please contact your care team if you have questions.     OTEZLA PO   Stopped by:  Virgil Goodson MD                    Primary Care Provider Office Phone # Fax #    Virgil Goodson [phone]       [address] [place] [id number]        Equal Access to Services     [facility]: Hadii aad ku hadasho Soomaali, waaxda luqadaha, qaybta kaalmada adeegyada, waxay cedin haymegha arrington . So [facility] [phone].    ATENCIÓN: Si habla español, tiene a myers disposición servicios gratuitos de asistencia lingüística. [facility] [phone].    We comply with applicable federal civil rights laws and [place] laws. We do not discriminate on the basis of race, color, national origin, age, disability, sex, sexual orientation, or gender identity.            Thank you!     Thank you for choosing [facility]  for your care. Our goal is always to provide you with excellent care. Hearing back from our patients is one way we can continue to improve our services. Please take a few minutes to complete the written survey that you may receive in the mail after your visit with us. Thank you!             Your Updated Medication List - Protect others around you: Learn how to safely use, store and throw away your medicines at  www.disposemymeds.org.          This list is accurate as of 8/13/18  8:18 AM.  Always use your most recent med list.                   Brand Name Dispense Instructions for use Diagnosis    ASPIRIN PO      Take 81 mg by mouth        atorvastatin 20 MG tablet    LIPITOR    30 tablet    TAKE ONE TABLET (20MG) BY MOUTH ONCE DAILY AT BEDTIME    Type 2 diabetes mellitus with hyperglycemia, without long-term current use of insulin (H)       blood glucose monitoring lancets     1 Box    Use to test blood sugars 1 times daily    Type 2 diabetes mellitus with hyperglycemia, without long-term current use of insulin (H)       blood glucose monitoring meter device kit     1 kit    Use to test blood sugars 1x times daily    Type 2 diabetes mellitus with hyperglycemia, without long-term current use of insulin (H)       * blood glucose monitoring test strip    ONETOUCH VERIO IQ    100 each    USE TO TEST BLOOD SUGARS ONE TIME DAILY    Type 2 diabetes mellitus with hyperglycemia, without long-term current use of insulin (H)       * blood glucose monitoring test strip    no brand specified    100 strip    Use to test blood sugars 1 times daily or as directed    Type 2 diabetes mellitus with hyperglycemia, without long-term current use of insulin (H)       calcium carbonate-Vit D-Min 600-400 MG-UNIT per tablet     100 tablet    Take 1 tablet by mouth daily. FOR BONE HEALTH        clobetasol 0.05 % ointment    TEMOVATE          FISH OIL PO      Take  by mouth.        ibuprofen 200 MG tablet    ADVIL/MOTRIN     Take 200 mg by mouth every 4 hours as needed for mild pain Reported on 4/19/2017        L-Tryptophan 500 MG Caps      Take by mouth daily        lisinopril-hydrochlorothiazide 20-12.5 MG per tablet    PRINZIDE/ZESTORETIC    90 tablet    Take 1 tablet by mouth daily    Essential hypertension       LORazepam 0.5 MG tablet    ATIVAN    20 tablet    Take 1 tablet (0.5 mg) by mouth every 8 hours as needed for anxiety    Dizziness        MELATONIN PO      Take  by mouth.        metFORMIN 500 MG tablet    GLUCOPHAGE    180 tablet    TAKE ONE TABLET (500MG) BY MOUTH TWICE DAILY WITH MEALS (NEED LABS)    Type 2 diabetes mellitus with hyperglycemia, without long-term current use of insulin (H)       METHOTREXATE PO      Take by mouth once a week        MULTIVITAMIN PO      None Entered        VITAMIN B-1 PO      Take  by mouth.        VITAMIN C PO      Take  by mouth.        * Notice:  This list has 2 medication(s) that are the same as other medications prescribed for you. Read the directions carefully, and ask your doctor or other care provider to review them with you.

## 2018-08-13 NOTE — PROGRESS NOTES
SUBJECTIVE:   Josefina Maldonado is a 67 year old female who presents to clinic today for the following health issues:      Hypertension Follow-up      Outpatient blood pressures are not being checked.    Low Salt Diet: not monitoring salt      Amount of exercise or physical activity: None    Problems taking medications regularly: No    Medication side effects: none    Diet: regular (no restrictions)    Diabetes Follow-up    Patient is checking blood sugars: not at all    Diabetic concerns: None     Symptoms of hypoglycemia (low blood sugar): none     Paresthesias (numbness or burning in feet) or sores: No     Date of last diabetic eye exam: ?    BP Readings from Last 2 Encounters:   08/13/18 132/80   07/24/18 139/82     Hemoglobin A1C (%)   Date Value   02/06/2018 5.6   04/19/2017 7.4 (H)     LDL Cholesterol Calculated (mg/dL)   Date Value   04/19/2017 94   04/11/2003 109     LDL Cholesterol Direct (mg/dL)   Date Value   03/05/2012 123       Diabetes Management Resources    Problem list and histories reviewed & adjusted, as indicated.  Additional history: as documented    Patient also states she would like a handicap parking sticker.  She reports that more difficult to ambulate due to her psoriasis which affects the plantar aspect of her feet as well as her psoriatic arthritis.  Patient does have a dermatologist that she is on a regular basis as well as a rheumatologist for these conditions.      Reviewed and updated as needed this visit by clinical staff  Allergies  Meds       Reviewed and updated as needed this visit by Provider         ROS:  Constitutional, HEENT, cardiovascular, pulmonary, gi and gu systems are negative, except as otherwise noted.    OBJECTIVE:                                                    /80  Pulse 90  Temp 98.1  F (36.7  C) (Oral)  Resp 15  Wt 258 lb 9.6 oz (117.3 kg)  LMP 04/11/2000  SpO2 93%  BMI 45.81 kg/m2  Body mass index is 45.81 kg/(m^2).  GENERAL APPEARANCE: alert,  no distress and over weight  HENT: normal cephalic/atraumatic  NECK: no adenopathyRash skin peeling both plantar surfaces of the foot.  SKIN: Erythematous  DIABETIC FOOT EXAM: normal DP and PT pulses, no  ulcerative lesions, normal sensory exam and normal monofilament exam    Diagnostic test results:  Results for orders placed or performed in visit on 08/13/18 (from the past 24 hour(s))   COMPREHENSIVE METABOLIC PANEL   Result Value Ref Range    Sodium 139 133 - 144 mmol/L    Potassium 4.4 3.4 - 5.3 mmol/L    Chloride 104 94 - 109 mmol/L    Carbon Dioxide 27 20 - 32 mmol/L    Anion Gap 8 3 - 14 mmol/L    Glucose 140 (H) 70 - 99 mg/dL    Urea Nitrogen 20 7 - 30 mg/dL    Creatinine 0.78 0.52 - 1.04 mg/dL    GFR Estimate 73 >60 mL/min/1.7m2    GFR Estimate If Black 88 >60 mL/min/1.7m2    Calcium 9.8 8.5 - 10.1 mg/dL    Bilirubin Total 0.3 0.2 - 1.3 mg/dL    Albumin 3.5 3.4 - 5.0 g/dL    Protein Total 8.2 6.8 - 8.8 g/dL    Alkaline Phosphatase 78 40 - 150 U/L    ALT 34 0 - 50 U/L    AST 32 0 - 45 U/L   HEMOGLOBIN A1C   Result Value Ref Range    Hemoglobin A1C 5.7 (H) 0 - 5.6 %   Lipid panel reflex to direct LDL Fasting   Result Value Ref Range    Cholesterol 112 <200 mg/dL    Triglycerides 126 <150 mg/dL    HDL Cholesterol 50 >49 mg/dL    LDL Cholesterol Calculated 37 <100 mg/dL    Non HDL Cholesterol 62 <130 mg/dL        ASSESSMENT/PLAN:                                                    1. Type 2 diabetes mellitus with hyperglycemia, without long-term current use of insulin (H)  Continue current control. Focus on weight loss  - COMPREHENSIVE METABOLIC PANEL  - HEMOGLOBIN A1C  - Albumin Random Urine Quantitative with Creat Ratio  - Lipid panel reflex to direct LDL Fasting  - blood glucose monitoring (NO BRAND SPECIFIED) test strip; Use to test blood sugars 1 times daily  Dispense: 100 strip; Refill: 11  - blood glucose monitoring (NO BRAND SPECIFIED) meter device kit; Use to test blood sugar 1x times daily or as  directed.  Dispense: 1 kit; Refill: 0    2. Morbid obesity (H)  Weight loss recommended    3. Psoriatic arthritis (H)  4. Psoriasis  At present she does not qualify for any handicap parking but I recommended the patient that given she sees her rheumatologist every 3 months and a dermatologist frequently after something specific to these conditions but they feel warrants handicap parking they can easily fill out a form just as I would.    Virgil Goodson MD  [facility]

## 2018-08-15 ENCOUNTER — TELEPHONE (OUTPATIENT)
Dept: INTERNAL MEDICINE | Facility: CLINIC | Age: 68
End: 2018-08-15

## 2018-08-15 DIAGNOSIS — E11.65 TYPE 2 DIABETES MELLITUS WITH HYPERGLYCEMIA, WITHOUT LONG-TERM CURRENT USE OF INSULIN (H): ICD-10-CM

## 2018-08-15 NOTE — TELEPHONE ENCOUNTER
MESSAGE: PATIENT REQUESTED WE CONTACT YOU TO HAVE THEIR PRESCRIPTION(S) FILLED FOR A 90-DAY SUPPLY. FAX NEW PRESCRIPTION(S) FOR ATORVASTATIN 20MG, LEODANU-MARY ANNEK FRANK PLS MRT, FRANK BAGLEY TST STRP [phone].

## 2018-08-16 RX ORDER — ATORVASTATIN CALCIUM 20 MG/1
TABLET, FILM COATED ORAL
Qty: 90 TABLET | Refills: 3 | Status: SHIPPED | OUTPATIENT
Start: 2018-08-16 | End: 2019-06-27

## 2018-10-05 DIAGNOSIS — E11.65 TYPE 2 DIABETES MELLITUS WITH HYPERGLYCEMIA, WITHOUT LONG-TERM CURRENT USE OF INSULIN (H): ICD-10-CM

## 2018-10-05 RX ORDER — BLOOD-GLUCOSE METER
EACH MISCELLANEOUS
Qty: 1 KIT | Refills: 0 | Status: SHIPPED | OUTPATIENT
Start: 2018-10-05 | End: 2018-12-24

## 2018-10-05 NOTE — TELEPHONE ENCOUNTER
"Prescription approved per [facility] Refill Protocol.      Requested Prescriptions   Pending Prescriptions Disp Refills     blood glucose monitoring (ACCU-CHEK FRANK PLUS) meter device kit [Pharmacy Med Name: ACCU-CHEK FRANK PLUS w/Device  Kit] 1 kit 0     Sig: TEST BLOOD SUGAR ONE TIME DAILY OR AS DIRECTED    Diabetic Supplies Protocol Passed    10/5/2018  3:18 PM       Passed - Patient is 18 years of age or older       Passed - Recent (6 mo) or future (30 days) visit within the authorizing provider's specialty    Patient had office visit in the last 6 months or has a visit in the next 30 days with authorizing provider.  See \"Patient Info\" tab in inbasket, or \"Choose Columns\" in Meds & Orders section of the refill encounter.              "

## 2018-12-24 DIAGNOSIS — E11.65 TYPE 2 DIABETES MELLITUS WITH HYPERGLYCEMIA, WITHOUT LONG-TERM CURRENT USE OF INSULIN (H): ICD-10-CM

## 2018-12-25 NOTE — TELEPHONE ENCOUNTER
"Requested Prescriptions   Pending Prescriptions Disp Refills     blood glucose monitoring (ACCU-CHEK FRANK PLUS) meter device kit [Pharmacy Med Name: ACCU-CHEK FRANK PLUS w/Device  Kit] 1 kit 0     Sig: TEST BLOOD SUGAR ONE TIME DAILY OR AS DIRECTED    Diabetic Supplies Protocol Passed - 12/24/2018  4:11 PM       Passed - Patient is 18 years of age or older       Passed - Recent (6 mo) or future (30 days) visit within the authorizing provider's specialty    Patient had office visit in the last 6 months or has a visit in the next 30 days with authorizing provider.  See \"Patient Info\" tab in inbasket, or \"Choose Columns\" in Meds & Orders section of the refill encounter.            Last Written Prescription Date:10/05/2018    Last Fill Quantity: 1 kit,  # refills: 0   Last Office Visit: 8/13/2018   Future Office Visit:       "

## 2018-12-26 RX ORDER — BLOOD-GLUCOSE METER
EACH MISCELLANEOUS
Qty: 1 KIT | Refills: 0 | Status: SHIPPED | OUTPATIENT
Start: 2018-12-26 | End: 2022-07-12

## 2018-12-26 NOTE — TELEPHONE ENCOUNTER
Prescription approved per [facility] Refill Protocol.    Mckenna HERNANDEZ RN, BSN, PHN

## 2019-01-22 DIAGNOSIS — E11.65 TYPE 2 DIABETES MELLITUS WITH HYPERGLYCEMIA, WITHOUT LONG-TERM CURRENT USE OF INSULIN (H): ICD-10-CM

## 2019-01-22 NOTE — TELEPHONE ENCOUNTER
"Requested Prescriptions   Pending Prescriptions Disp Refills     metFORMIN (GLUCOPHAGE) 500 MG tablet [Pharmacy Med Name: METFORMIN HYDROCHLORIDE 500 MG Tablet] 180 tablet 0    Last Written Prescription Date:  8/13/18  Last Fill Quantity: 180,  # refills: 3   Last office visit: 8/13/2018 with prescribing provider:  8/13/18   Future Office Visit:     Sig: TAKE 1 TABLET  TWICE DAILY WITH MEALS (NEED LABS)    Biguanide Agents Passed - 1/22/2019  3:57 PM       Passed - Blood pressure less than 140/90 in past 6 months    BP Readings from Last 3 Encounters:   08/13/18 132/80   07/24/18 139/82   02/06/18 122/70                Passed - Patient has documented LDL within the past 12 mos.    Recent Labs   Lab Test 08/13/18  0829   LDL 37            Passed - Patient has had a Microalbumin in the past 15 mos.    Recent Labs   Lab Test 08/13/18  0837   MICROL 45   UMALCR 36.59*            Passed - Patient is age 10 or older       Passed - Patient has documented A1c within the specified period of time.    If HgbA1C is 8 or greater, it needs to be on file within the past 3 months.  If less than 8, must be on file within the past 6 months.     Recent Labs   Lab Test 08/13/18  0829   A1C 5.7*            Passed - Patient's CR is NOT>1.4 OR Patient's EGFR is NOT<45 within past 12 mos.    Recent Labs   Lab Test 08/13/18  0829   GFRESTIMATED 73   GFRESTBLACK 88       Recent Labs   Lab Test 08/13/18  0829   CR 0.78            Passed - Patient does NOT have a diagnosis of CHF.       Passed - Medication is active on med list       Passed - Patient is not pregnant       Passed - Patient has not had a positive pregnancy test within the past 12 mos.        Passed - Recent (6 mo) or future (30 days) visit within the authorizing provider's specialty    Patient had office visit in the last 6 months or has a visit in the next 30 days with authorizing provider or within the authorizing provider's specialty.  See \"Patient Info\" tab in inbasket, or " "\"Choose Columns\" in Meds & Orders section of the refill encounter.              "

## 2019-03-27 DIAGNOSIS — I10 ESSENTIAL HYPERTENSION: ICD-10-CM

## 2019-03-27 DIAGNOSIS — E11.65 TYPE 2 DIABETES MELLITUS WITH HYPERGLYCEMIA, WITHOUT LONG-TERM CURRENT USE OF INSULIN (H): ICD-10-CM

## 2019-03-27 NOTE — TELEPHONE ENCOUNTER
Patient is overdue for follow-up. Please ask her to schedule follow-up ASAP. Can refill medication temporarily if she anticipates running out prior to scheduled appointment.     Thank you.

## 2019-03-27 NOTE — TELEPHONE ENCOUNTER
Routing refill request to provider for review/approval because:  Roberta given x1 and patient did not follow up, please advise  Labs not current:  A1c    Mckenna HERNANDEZ RN, BSN, PHN

## 2019-03-27 NOTE — TELEPHONE ENCOUNTER
"Requested Prescriptions   Pending Prescriptions Disp Refills     metFORMIN (GLUCOPHAGE) 500 MG tablet [Pharmacy Med Name: METFORMIN HYDROCHLORIDE 500 MG Tablet] 180 tablet 0     Sig: TAKE 1 TABLET  TWICE DAILY WITH MEALS (NEED LABS)    Biguanide Agents Failed - 3/27/2019  2:06 PM       Failed - Blood pressure less than 140/90 in past 6 months    BP Readings from Last 3 Encounters:   08/13/18 132/80   07/24/18 139/82   02/06/18 122/70   Last Written Prescription Date:  1/22/2019  Last Fill Quantity: 180,  # refills: 0   Last office visit: 8/13/2018 with prescribing provider:  Virgil Goodson     Future Office Visit:                 Failed - Patient has documented A1c within the specified period of time.    If HgbA1C is 8 or greater, it needs to be on file within the past 3 months.  If less than 8, must be on file within the past 6 months.     Recent Labs   Lab Test 08/13/18  0829   A1C 5.7*            Failed - Recent (6 mo) or future (30 days) visit within the authorizing provider's specialty    Patient had office visit in the last 6 months or has a visit in the next 30 days with authorizing provider or within the authorizing provider's specialty.  See \"Patient Info\" tab in inbasket, or \"Choose Columns\" in Meds & Orders section of the refill encounter.           Passed - Patient has documented LDL within the past 12 mos.    Recent Labs   Lab Test 08/13/18  0829   LDL 37            Passed - Patient has had a Microalbumin in the past 15 mos.    Recent Labs   Lab Test 08/13/18  0837   MICROL 45   UMALCR 36.59*            Passed - Patient is age 10 or older       Passed - Patient's CR is NOT>1.4 OR Patient's EGFR is NOT<45 within past 12 mos.    Recent Labs   Lab Test 08/13/18  0829   GFRESTIMATED 73   GFRESTBLACK 88       Recent Labs   Lab Test 08/13/18  0829   CR 0.78            Passed - Patient does NOT have a diagnosis of CHF.       Passed - Medication is active on med list       Passed - Patient is not pregnant "       Passed - Patient has not had a positive pregnancy test within the past 12 mos.

## 2019-03-28 RX ORDER — LISINOPRIL AND HYDROCHLOROTHIAZIDE 12.5; 2 MG/1; MG/1
1 TABLET ORAL DAILY
Qty: 90 TABLET | Refills: 2 | Status: SHIPPED | OUTPATIENT
Start: 2019-03-28 | End: 2019-04-01

## 2019-04-01 DIAGNOSIS — I10 ESSENTIAL HYPERTENSION: ICD-10-CM

## 2019-04-03 RX ORDER — LISINOPRIL AND HYDROCHLOROTHIAZIDE 12.5; 2 MG/1; MG/1
TABLET ORAL
Qty: 90 TABLET | Refills: 1 | Status: SHIPPED | OUTPATIENT
Start: 2019-04-03 | End: 2019-10-26

## 2019-04-03 NOTE — TELEPHONE ENCOUNTER
"Requested Prescriptions   Pending Prescriptions Disp Refills     lisinopril-hydrochlorothiazide (PRINZIDE/ZESTORETIC) 20-12.5 MG tablet [Pharmacy Med Name: LISINOPRIL/HCTZ 20-12.5MG TAB] 90 tablet 0     Sig: TAKE 1 TABLET BY MOUTH ONCE DAILY    Diuretics (Including Combos) Protocol Passed - 4/1/2019  5:34 PM       Passed - Blood pressure under 140/90 in past 12 months    BP Readings from Last 3 Encounters:   08/13/18 132/80   07/24/18 139/82   02/06/18 122/70                Passed - Recent (12 mo) or future (30 days) visit within the authorizing provider's specialty    Patient had office visit in the last 12 months or has a visit in the next 30 days with authorizing provider or within the authorizing provider's specialty.  See \"Patient Info\" tab in inbasket, or \"Choose Columns\" in Meds & Orders section of the refill encounter.             Passed - Medication is active on med list       Passed - Patient is age 18 or older       Passed - No active pregancy on record       Passed - Normal serum creatinine on file in past 12 months    Recent Labs   Lab Test 08/13/18  0829   CR 0.78             Passed - Normal serum potassium on file in past 12 months    Recent Labs   Lab Test 08/13/18  0829   POTASSIUM 4.4                   Passed - Normal serum sodium on file in past 12 months    Recent Labs   Lab Test 08/13/18  0829                Passed - No positive pregnancy test in past 12 months      Last office visit: 8/13/2018 with prescribing provider:     Future Office Visit:   Next 5 appointments (look out 90 days)    Apr 15, 2019 10:20 AM CDT  [facility] Visit with Virgil Goodson MD  [facility] [address] [address] [phone]         Prescription approved per [facility] Refill Protocol.      "

## 2019-04-15 ENCOUNTER — OFFICE VISIT (OUTPATIENT)
Dept: INTERNAL MEDICINE | Facility: CLINIC | Age: 69
End: 2019-04-15
Payer: COMMERCIAL

## 2019-04-15 VITALS
RESPIRATION RATE: 18 BRPM | HEIGHT: 62 IN | SYSTOLIC BLOOD PRESSURE: 134 MMHG | HEART RATE: 104 BPM | TEMPERATURE: 98.3 F | WEIGHT: 252.8 LBS | OXYGEN SATURATION: 98 % | BODY MASS INDEX: 46.52 KG/M2 | DIASTOLIC BLOOD PRESSURE: 80 MMHG

## 2019-04-15 DIAGNOSIS — Z12.31 VISIT FOR SCREENING MAMMOGRAM: ICD-10-CM

## 2019-04-15 DIAGNOSIS — E66.01 MORBID OBESITY (H): ICD-10-CM

## 2019-04-15 DIAGNOSIS — Z00.00 MEDICARE ANNUAL WELLNESS VISIT, INITIAL: ICD-10-CM

## 2019-04-15 DIAGNOSIS — L03.115 CELLULITIS OF RIGHT LEG: ICD-10-CM

## 2019-04-15 DIAGNOSIS — S81.801A OPEN WOUND OF RIGHT KNEE, LEG, AND ANKLE, INITIAL ENCOUNTER: ICD-10-CM

## 2019-04-15 DIAGNOSIS — Z78.0 POST-MENOPAUSAL: ICD-10-CM

## 2019-04-15 DIAGNOSIS — S81.001A OPEN WOUND OF RIGHT KNEE, LEG, AND ANKLE, INITIAL ENCOUNTER: ICD-10-CM

## 2019-04-15 DIAGNOSIS — I10 ESSENTIAL HYPERTENSION: ICD-10-CM

## 2019-04-15 DIAGNOSIS — S91.001A OPEN WOUND OF RIGHT KNEE, LEG, AND ANKLE, INITIAL ENCOUNTER: ICD-10-CM

## 2019-04-15 DIAGNOSIS — E11.9 TYPE 2 DIABETES MELLITUS WITHOUT COMPLICATION, WITHOUT LONG-TERM CURRENT USE OF INSULIN (H): Primary | ICD-10-CM

## 2019-04-15 DIAGNOSIS — L40.50 PSORIATIC ARTHRITIS (H): ICD-10-CM

## 2019-04-15 LAB
ERYTHROCYTE [DISTWIDTH] IN BLOOD BY AUTOMATED COUNT: 15.3 % (ref 10–15)
HBA1C MFR BLD: 5.8 % (ref 0–5.6)
HCT VFR BLD AUTO: 41.4 % (ref 35–47)
HGB BLD-MCNC: 13.6 G/DL (ref 11.7–15.7)
MCH RBC QN AUTO: 30.2 PG (ref 26.5–33)
MCHC RBC AUTO-ENTMCNC: 32.9 G/DL (ref 31.5–36.5)
MCV RBC AUTO: 92 FL (ref 78–100)
PLATELET # BLD AUTO: 311 10E9/L (ref 150–450)
RBC # BLD AUTO: 4.51 10E12/L (ref 3.8–5.2)
WBC # BLD AUTO: 10.3 10E9/L (ref 4–11)

## 2019-04-15 PROCEDURE — 36415 COLL VENOUS BLD VENIPUNCTURE: CPT | Performed by: INTERNAL MEDICINE

## 2019-04-15 PROCEDURE — G0438 PPPS, INITIAL VISIT: HCPCS | Performed by: INTERNAL MEDICINE

## 2019-04-15 PROCEDURE — 99214 OFFICE O/P EST MOD 30 MIN: CPT | Mod: 25 | Performed by: INTERNAL MEDICINE

## 2019-04-15 PROCEDURE — 85027 COMPLETE CBC AUTOMATED: CPT | Performed by: INTERNAL MEDICINE

## 2019-04-15 PROCEDURE — 83036 HEMOGLOBIN GLYCOSYLATED A1C: CPT | Performed by: INTERNAL MEDICINE

## 2019-04-15 RX ORDER — CEPHALEXIN 500 MG/1
500 CAPSULE ORAL 3 TIMES DAILY
Qty: 15 CAPSULE | Refills: 0 | Status: SHIPPED | OUTPATIENT
Start: 2019-04-15 | End: 2019-04-20

## 2019-04-15 RX ORDER — MEDROXYPROGESTERONE ACETATE 150 MG/ML
INJECTION, SUSPENSION INTRAMUSCULAR
COMMUNITY
Start: 2018-10-04 | End: 2021-03-18

## 2019-04-15 RX ORDER — BLOOD GLUCOSE CONTROL HIGH,LOW
EACH MISCELLANEOUS
Qty: 1 BOTTLE | Refills: 0 | Status: SHIPPED | OUTPATIENT
Start: 2019-04-15 | End: 2022-11-18

## 2019-04-15 ASSESSMENT — ACTIVITIES OF DAILY LIVING (ADL): CURRENT_FUNCTION: NO ASSISTANCE NEEDED

## 2019-04-15 ASSESSMENT — MIFFLIN-ST. JEOR: SCORE: 1612

## 2019-04-15 NOTE — PROGRESS NOTES
"  SUBJECTIVE:   Josefina Maldonado is a 68 year old female who presents to clinic today for the following   health issues:    Diabetes Follow-up    Patient is checking blood sugars: once daily.  Results are as follows:         am     Diabetic concerns: None     Symptoms of hypoglycemia (low blood sugar): none     Paresthesias (numbness or burning in feet) or sores: No     Date of last diabetic eye exam: due     BP Readings from Last 2 Encounters:   04/15/19 134/80   08/13/18 132/80     Hemoglobin A1C (%)   Date Value   08/13/2018 5.7 (H)   02/06/2018 5.6     LDL Cholesterol Calculated (mg/dL)   Date Value   08/13/2018 37   04/19/2017 94       Diabetes Management Resources    Hypertension Follow-up      Outpatient blood pressures are not being checked.    Low Salt Diet: not monitoring salt    Right lower extremity scabbed area with some localized tenderness  -She reports having this ever since fall that broke her left humerus over 1 year ago  She has since been placed on Enbrel for her psoriatic arthritis    Annual Wellness Visit    Are you in the first 12 months of your Medicare Part B coverage?  No    Physical Health:    In general, how would you rate your overall physical health? good    If you drink alcohol do you typically have >3 drinks per day or >7 drinks per week? Not Applicable    Do you usually eat at least 4 servings of fruit and vegetables a day, include whole grains & fiber and avoid regularly eating high fat or \"junk\" foods? Yes    Needs assistance for the following daily activities: no assistance needed    Which of the following safety concerns are present in your home?  none identified     Hearing impairment: No    In the past 6 months, have you been bothered by leaking of urine? yes    Mental Health:    In general, how would you rate your overall mental or emotional health? good  PHQ-2 Score: (P) 0    Do you feel safe in your environment? Yes    Do you have a Health Care Directive? No: Advance care " "planning was reviewed with patient; patient declined at this time.    Fall risk:  Fallen 2 or more times in the past year?: No  Any fall with injury in the past year?: No    Cognitive Screenin) Repeat 3 items (Leader, Season, Table)    2) Clock draw: NORMAL  3) 3 item recall: Recalls 3 objects  Results: 3 items recalled: COGNITIVE IMPAIRMENT LESS LIKELY    Mini-CogTM Copyright S Charissa. Licensed by the author for use in [facility]; reprinted with permission (austin@[facility]). All rights reserved.      Current providers sharing in care for this patient include:   Patient Care Team:  Virgil Goodson MD as PCP - General (Internal Medicine)  Virgil Goodson MD as Assigned PCP      Additional history: as documented    Reviewed  and updated as needed this visit by clinical staff  Tobacco  Allergies  Meds  Med Hx  Surg Hx  Fam Hx  Soc Hx        Reviewed and updated as needed this visit by Provider         ROS:  Constitutional, HEENT, cardiovascular, pulmonary, gi and gu systems are negative, except as otherwise noted.    OBJECTIVE:                                                    /80   Pulse 104   Temp 98.3  F (36.8  C) (Oral)   Resp 18   Ht 1.562 m (5' 1.5\")   Wt 114.7 kg (252 lb 12.8 oz)   LMP 2000   SpO2 98%   Breastfeeding? No   BMI 46.99 kg/m    Body mass index is 46.99 kg/m .  GENERAL APPEARANCE: alert, no distress and over weight  EYES: Eyes grossly normal to inspection, PERRL and conjunctivae and sclerae normal  HENT: nose and mouth without ulcers or lesions and normal cephalic/atraumatic  NECK: no adenopathy, no asymmetry, masses, or scars and thyroid normal to palpation  RESP: lungs clear to auscultation - no rales, rhonchi or wheezes  CV: regular rates and rhythm, normal S1 S2, no S3 or S4 and no murmur, click or rub  ABDOMEN: bowel sounds normal and obese  MS: No swollen joints  SKIN: There is a scabbed elliptical lesion on the right shin.  Some tenderness " and localized erythema and warmth surrounding this and distal to this.  This is absent on the left leg  NEURO: Normal strength and tone, mentation intact and speech normal  DIABETIC FOOT EXAM: normal DP and PT pulses, no trophic changes or ulcerative lesions, normal sensory exam and normal monofilament exam  PSYCH: mentation appears normal and affect normal/bright    Diagnostic test results:  Results for orders placed or performed in visit on 04/15/19 (from the past 24 hour(s))   HEMOGLOBIN A1C   Result Value Ref Range    Hemoglobin A1C 5.8 (H) 0 - 5.6 %   CBC with platelets   Result Value Ref Range    WBC 10.3 4.0 - 11.0 10e9/L    RBC Count 4.51 3.8 - 5.2 10e12/L    Hemoglobin 13.6 11.7 - 15.7 g/dL    Hematocrit 41.4 35.0 - 47.0 %    MCV 92 78 - 100 fl    MCH 30.2 26.5 - 33.0 pg    MCHC 32.9 31.5 - 36.5 g/dL    RDW 15.3 (H) 10.0 - 15.0 %    Platelet Count 311 150 - 450 10e9/L        ASSESSMENT/PLAN:                                                    1. Type 2 diabetes mellitus with hyperglycemia, without long-term current use of insulin (H)  Morbid Obesity  Excellent control.  Continue present meds and focus on weight loss.   - HEMOGLOBIN A1C  - blood glucose calibration (ACCU-CHEK FRANK) solution; Use to calibrate blood glucose monitor as needed as directed.  Dispense: 1 Bottle; Refill: 0    2. Cellulitis of right leg  Given her immunocompromised state cover with Keflex  - CBC with platelets  - cephALEXin (KEFLEX) 500 MG capsule; Take 1 capsule (500 mg) by mouth 3 times daily for 5 days  Dispense: 15 capsule; Refill: 0    3. Open wound of right knee, leg, and ankle, initial encounter  Scabbed lesion could be pyoderma gangrenosum versus actual ulcer.  She is on Enbrel.  As above given the surrounding tissue appearance will cover her with some Keflex but would like Derm to look at this see if it requires biopsy.     4. Medicare visit- initial  DEXA   Ca screening -  mammo needed  immuniz- rec'd shingrix. P-23 should  be repeated next visit. Annual influenza in fall.     5. Post-menopausal  I explained to the patient that her low trauma fracture is concerning to me for future fracture risk.  Despite her last DEXA being normal this was back in 2012.  I recommend a repeat this year.  - DX Hip/Pelvis/Spine; Future    6. Visit for screening mammogram  - *MA Screening Digital Bilateral; Future      Follow up with Provider - 3 mo labs, 6 mo visit     Virgil Goodson MD  [facility]

## 2019-04-15 NOTE — PATIENT INSTRUCTIONS
Preventive Health Recommendations    See your health care provider every year to    Review health changes.     Discuss preventive care.      Review your medicines if your doctor has prescribed any.    You no longer need a yearly Pap test unless you've had an abnormal Pap test in the past 10 years. If you have vaginal symptoms, such as bleeding or discharge, be sure to talk with your provider about a Pap test.    Every 1 to 2 years, have a mammogram.  If you are over 69, talk with your health care provider about whether or not you want to continue having screening mammograms.    Every 10 years, have a colonoscopy. Or, have a yearly FIT test (stool test). These exams will check for colon cancer.     Have a cholesterol test every 5 years, or more often if your doctor advises it.     Have a diabetes test (fasting glucose) every three years. If you are at risk for diabetes, you should have this test more often.     At age 65, have a bone density scan (DEXA) to check for osteoporosis (brittle bone disease).    Shots:    Get a flu shot each year.    Get a tetanus shot every 10 years.    Talk to your doctor about your pneumonia vaccines. There are now two you should receive - Pneumovax (PPSV 23) and Prevnar (PCV 13).    Talk to your pharmacist about the shingles vaccine. (Shingrix)    Talk to your doctor about the hepatitis B vaccine.    Nutrition:     Eat at least 5 servings of fruits and vegetables each day.    Eat whole-grain bread, whole-wheat pasta and brown rice instead of white grains and rice.    Get adequate Calcium and Vitamin D.     Lifestyle    Exercise at least 150 minutes a week (30 minutes a day, 5 days a week). This will help you control your weight and prevent disease.    Limit alcohol to one drink per day.    No smoking.     Wear sunscreen to prevent skin cancer.     See your dentist twice a year for an exam and cleaning.    See your eye doctor every 1 to 2 years to screen for conditions such as  glaucoma, macular degeneration and cataracts.    Personalized Prevention Plan  You are due for the preventive services outlined below.  Your care team is available to assist you in scheduling these services.  If you have already completed any of these items, please share that information with your care team to update in your medical record.  Health Maintenance Due   Topic Date Due     Mammogram - every 2 years  08/27/1992     Zoster (Shingles) Vaccine (1 of 2) 10/26/1998     Eye Exam - yearly  07/16/2013     Annual Wellness Visit  10/26/2013     Discuss Advance Directive Planning  12/08/2016     Diptheria Tetanus Pertussis (DTAP/TDAP/TD) Vaccine (2 - Td) 06/24/2018     Flu Vaccine (1) 09/01/2018     Complete Blood Count Every Year  02/06/2019     Pneumococcal Vaccine (2 of 2 - PPSV23) 02/06/2019     A1C (Diabetes) Lab - every 6 months  02/13/2019

## 2019-05-30 DIAGNOSIS — E11.9 TYPE 2 DIABETES MELLITUS WITHOUT COMPLICATION, WITHOUT LONG-TERM CURRENT USE OF INSULIN (H): ICD-10-CM

## 2019-05-30 NOTE — TELEPHONE ENCOUNTER
"Requested Prescriptions   Pending Prescriptions Disp Refills     metFORMIN (GLUCOPHAGE) 500 MG tablet [Pharmacy Med Name: METFORMIN HYDROCHLORIDE 500 MG Tablet] 180 tablet 0     Sig: TAKE 1 TABLET  TWICE DAILY WITH MEALS (NEED LABS)       Biguanide Agents Passed - 5/30/2019  3:42 AM        Passed - Blood pressure less than 140/90 in past 6 months     BP Readings from Last 3 Encounters:   04/15/19 134/80   08/13/18 132/80   07/24/18 139/82                 Passed - Patient has documented LDL within the past 12 mos.     Recent Labs   Lab Test 08/13/18  0829   LDL 37             Passed - Patient has had a Microalbumin in the past 15 mos.     Recent Labs   Lab Test 08/13/18  0837   MICROL 45   UMALCR 36.59*             Passed - Patient is age 10 or older        Passed - Patient has documented A1c within the specified period of time.     If HgbA1C is 8 or greater, it needs to be on file within the past 3 months.  If less than 8, must be on file within the past 6 months.     Recent Labs   Lab Test 04/15/19  1129   A1C 5.8*             Passed - Patient's CR is NOT>1.4 OR Patient's EGFR is NOT<45 within past 12 mos.     Recent Labs   Lab Test 08/13/18  0829   GFRESTIMATED 73   GFRESTBLACK 88       Recent Labs   Lab Test 08/13/18  0829   CR 0.78             Passed - Patient does NOT have a diagnosis of CHF.        Passed - Medication is active on med list        Passed - Patient is not pregnant        Passed - Patient has not had a positive pregnancy test within the past 12 mos.         Passed - Recent (6 mo) or future (30 days) visit within the authorizing provider's specialty     Patient had office visit in the last 6 months or has a visit in the next 30 days with authorizing provider or within the authorizing provider's specialty.  See \"Patient Info\" tab in inbasket, or \"Choose Columns\" in Meds & Orders section of the refill encounter.              "

## 2019-06-04 DIAGNOSIS — E11.9 TYPE 2 DIABETES MELLITUS WITHOUT COMPLICATION, WITHOUT LONG-TERM CURRENT USE OF INSULIN (H): ICD-10-CM

## 2019-06-04 NOTE — TELEPHONE ENCOUNTER
"Requested Prescriptions   Pending Prescriptions Disp Refills     metFORMIN (GLUCOPHAGE) 500 MG tablet [Pharmacy Med Name: METFORMIN HYDROCHLORIDE 500 MG Tablet] 180 tablet 0     Sig: TAKE 1 TABLET  TWICE DAILY WITH MEALS (NEED LABS)       Biguanide Agents Passed - 6/4/2019  1:39 PM        Passed - Blood pressure less than 140/90 in past 6 months     BP Readings from Last 3 Encounters:   04/15/19 134/80   08/13/18 132/80   07/24/18 139/82                 Passed - Patient has documented LDL within the past 12 mos.     Recent Labs   Lab Test 08/13/18  0829   LDL 37             Passed - Patient has had a Microalbumin in the past 15 mos.     Recent Labs   Lab Test 08/13/18  0837   MICROL 45   UMALCR 36.59*             Passed - Patient is age 10 or older        Passed - Patient has documented A1c within the specified period of time.     If HgbA1C is 8 or greater, it needs to be on file within the past 3 months.  If less than 8, must be on file within the past 6 months.     Recent Labs   Lab Test 04/15/19  1129   A1C 5.8*             Passed - Patient's CR is NOT>1.4 OR Patient's EGFR is NOT<45 within past 12 mos.     Recent Labs   Lab Test 08/13/18  0829   GFRESTIMATED 73   GFRESTBLACK 88       Recent Labs   Lab Test 08/13/18  0829   CR 0.78             Passed - Patient does NOT have a diagnosis of CHF.        Passed - Medication is active on med list        Passed - Patient is not pregnant        Passed - Patient has not had a positive pregnancy test within the past 12 mos.         Passed - Recent (6 mo) or future (30 days) visit within the authorizing provider's specialty     Patient had office visit in the last 6 months or has a visit in the next 30 days with authorizing provider or within the authorizing provider's specialty.  See \"Patient Info\" tab in inbasket, or \"Choose Columns\" in Meds & Orders section of the refill encounter.              Last Written Prescription Date:  5/30/19  Last Fill Quantity: 180,  # " refills: 0   Last office visit: 4/15/2019 with prescribing provider:  Steve   Future Office Visit:

## 2019-06-27 DIAGNOSIS — E11.65 TYPE 2 DIABETES MELLITUS WITH HYPERGLYCEMIA, WITHOUT LONG-TERM CURRENT USE OF INSULIN (H): ICD-10-CM

## 2019-06-27 RX ORDER — ATORVASTATIN CALCIUM 20 MG/1
TABLET, FILM COATED ORAL
Qty: 90 TABLET | Refills: 0 | Status: SHIPPED | OUTPATIENT
Start: 2019-06-27 | End: 2019-09-01

## 2019-06-27 NOTE — TELEPHONE ENCOUNTER
"Requested Prescriptions   Pending Prescriptions Disp Refills     atorvastatin (LIPITOR) 20 MG tablet [Pharmacy Med Name: ATORVASTATIN CALCIUM 20 MG Tablet] 90 tablet 3     Sig: TAKE 1 TABLET AT BEDTIME       Statins Protocol Passed - 6/27/2019  1:25 PM        Passed - LDL on file in past 12 months     Recent Labs   Lab Test 08/13/18  0829   LDL 37             Passed - No abnormal creatine kinase in past 12 months     No lab results found.             Passed - Recent (12 mo) or future (30 days) visit within the authorizing provider's specialty     Patient had office visit in the last 12 months or has a visit in the next 30 days with authorizing provider or within the authorizing provider's specialty.  See \"Patient Info\" tab in inbasket, or \"Choose Columns\" in Meds & Orders section of the refill encounter.              Passed - Medication is active on med list        Passed - Patient is age 18 or older        Passed - No active pregnancy on record        Passed - No positive pregnancy test in past 12 months          Last Written Prescription Date:  8/16/18  Last Fill Quantity: 90,  # refills: 3   Last office visit: 4/15/2019 with prescribing provider:  Kellee   Future Office Visit:      "

## 2019-06-27 NOTE — TELEPHONE ENCOUNTER
Prescription approved per [facility] Refill Protocol.    Mckenna HERNANDEZ RN, BSN, PHN

## 2019-09-01 DIAGNOSIS — E11.65 TYPE 2 DIABETES MELLITUS WITH HYPERGLYCEMIA, WITHOUT LONG-TERM CURRENT USE OF INSULIN (H): ICD-10-CM

## 2019-09-01 NOTE — LETTER
[facility]  [address] [id number] [phone]            Josefina Maldonado  [address]  [facility] [address]        September 3, 2019    Dear Josefina,    While refilling your prescription today, we noticed that you are due to have labs drawn.  We will refill your prescription for 30 days, but a follow-up appointment must be made before any additional refills can be approved.     Taking care of your health is important to us and we look forward to seeing you in the near future.  Please call us at [phone] or [phone] (or use [employer]) to schedule an appointment.     Please disregard this notice if you have already made an appointment.    Sincerely,        [facility]

## 2019-09-02 NOTE — TELEPHONE ENCOUNTER
"Requested Prescriptions   Pending Prescriptions Disp Refills     atorvastatin (LIPITOR) 20 MG tablet [Pharmacy Med Name: ATORVASTATIN CALCIUM 20 MG Tablet] 90 tablet 0     Sig: TAKE 1 TABLET AT BEDTIME (DUE FOR FASTING LABS AUGUST 2019)   Last Written Prescription Date:  6/27/2019  Last Fill Quantity: 90,  # refills: 0   Last Office Visit: 4/15/2019   Future Office Visit:         Statins Protocol Failed - 9/1/2019  7:54 PM        Failed - LDL on file in past 12 months     Recent Labs   Lab Test 08/13/18  0829   LDL 37             Passed - No abnormal creatine kinase in past 12 months     No lab results found.             Passed - Recent (12 mo) or future (30 days) visit within the authorizing provider's specialty     Patient had office visit in the last 12 months or has a visit in the next 30 days with authorizing provider or within the authorizing provider's specialty.  See \"Patient Info\" tab in inbasket, or \"Choose Columns\" in Meds & Orders section of the refill encounter.              Passed - Medication is active on med list        Passed - Patient is age 18 or older        Passed - No active pregnancy on record        Passed - No positive pregnancy test in past 12 months          "

## 2019-09-03 RX ORDER — ATORVASTATIN CALCIUM 20 MG/1
TABLET, FILM COATED ORAL
Qty: 30 TABLET | Refills: 0 | Status: SHIPPED | OUTPATIENT
Start: 2019-09-03 | End: 2019-09-26

## 2019-09-08 DIAGNOSIS — E11.65 TYPE 2 DIABETES MELLITUS WITH HYPERGLYCEMIA, WITHOUT LONG-TERM CURRENT USE OF INSULIN (H): ICD-10-CM

## 2019-09-10 NOTE — TELEPHONE ENCOUNTER
"Requested Prescriptions   Pending Prescriptions Disp Refills     blood glucose (ACCU-CHEK FRANK PLUS) test strip [Pharmacy Med Name: ACCU-CHEK FRANK PLUS   Strip] 100 strip 11     Sig: USE TO TEST BLOOD SUGARS ONE TIME DAILY       Diabetic Supplies Protocol Passed - 9/8/2019  6:38 PM        Passed - Medication is active on med list        Passed - Patient is 18 years of age or older        Passed - Recent (6 mo) or future (30 days) visit within the authorizing provider's specialty     Patient had office visit in the last 6 months or has a visit in the next 30 days with authorizing provider.  See \"Patient Info\" tab in inbasket, or \"Choose Columns\" in Meds & Orders section of the refill encounter.              "

## 2019-09-26 DIAGNOSIS — E11.65 TYPE 2 DIABETES MELLITUS WITH HYPERGLYCEMIA, WITHOUT LONG-TERM CURRENT USE OF INSULIN (H): ICD-10-CM

## 2019-09-26 NOTE — LETTER
[facility]  [address] [id number] [phone]            Josefina Maldonado  [address]  [facility] [address]        October 1, 2019    Dear Josefina,    While refilling your prescription today, we noticed that you are due for an appointment with your provider.  We will refill your prescription for 30 days, but a follow-up appointment must be made before any additional refills can be approved.     Taking care of your health is important to us and we look forward to seeing you in the near future.  Please call us at [phone] or [phone] (or use [email or web address]) to schedule an appointment.     Please disregard this notice if you have already made an appointment.    Sincerely,        [facility]

## 2019-09-27 NOTE — TELEPHONE ENCOUNTER
"Last Written Prescription Date:  09/03/2019  Last Fill Quantity: 30,  # refills: 0   Last office visit: 4/15/2019 with prescribing provider:  04/15/2019 with MR   Future Office Visit:  Requested Prescriptions   Pending Prescriptions Disp Refills     atorvastatin (LIPITOR) 20 MG tablet [Pharmacy Med Name: ATORVASTATIN CALCIUM 20 MG Tablet] 30 tablet 0     Sig: TAKE 1 TABLET AT BEDTIME (DUE FOR FASTING LABS AUGUST 2019)       Statins Protocol Failed - 9/26/2019  9:57 PM        Failed - LDL on file in past 12 months     Recent Labs   Lab Test 08/13/18  0829   LDL 37             Passed - No abnormal creatine kinase in past 12 months     No lab results found.             Passed - Recent (12 mo) or future (30 days) visit within the authorizing provider's specialty     Patient had office visit in the last 12 months or has a visit in the next 30 days with authorizing provider or within the authorizing provider's specialty.  See \"Patient Info\" tab in inbasket, or \"Choose Columns\" in Meds & Orders section of the refill encounter.              Passed - Medication is active on med list        Passed - Patient is age 18 or older        Passed - No active pregnancy on record        Passed - No positive pregnancy test in past 12 months        "

## 2019-09-30 ENCOUNTER — HEALTH MAINTENANCE LETTER (OUTPATIENT)
Age: 69
End: 2019-09-30

## 2019-09-30 RX ORDER — ATORVASTATIN CALCIUM 20 MG/1
TABLET, FILM COATED ORAL
Qty: 30 TABLET | Refills: 0 | Status: SHIPPED | OUTPATIENT
Start: 2019-09-30 | End: 2020-01-07

## 2019-10-26 DIAGNOSIS — I10 ESSENTIAL HYPERTENSION: ICD-10-CM

## 2019-10-26 NOTE — LETTER
[facility]  [address] [id number] [phone]            Josefina Maldonado  [address]  [facility] [address]        October 28, 2019    Dear Josefina,    While refilling your prescription today, we noticed that you are due to have labs drawn.  We will refill your prescription for 30 days, but a follow-up appointment must be made before any additional refills can be approved.     Taking care of your health is important to us and we look forward to seeing you in the near future.  Please call us at [phone] or [phone] (or use [employer]) to schedule an appointment.     Please disregard this notice if you have already made an appointment.    Sincerely,        [facility]

## 2019-10-27 NOTE — TELEPHONE ENCOUNTER
"Requested Prescriptions   Pending Prescriptions Disp Refills     lisinopril-hydrochlorothiazide (PRINZIDE/ZESTORETIC) 20-12.5 MG tablet [Pharmacy Med Name: LISINOPRIL/HYDROCHLOROTHIAZIDE 20-12.5 MG Tablet] 90 tablet 2     Sig: TAKE 1 TABLET EVERY DAY   Last Written Prescription Date:  4/3/2019  Last Fill Quantity: 90,  # refills: 1   Last Office Visit: 4/15/2019   Future Office Visit:         Diuretics (Including Combos) Protocol Failed - 10/26/2019  1:31 PM        Failed - Normal serum creatinine on file in past 12 months     Recent Labs   Lab Test 08/13/18  0829   CR 0.78              Failed - Normal serum potassium on file in past 12 months     Recent Labs   Lab Test 08/13/18  0829   POTASSIUM 4.4                    Failed - Normal serum sodium on file in past 12 months     Recent Labs   Lab Test 08/13/18  0829                 Passed - Blood pressure under 140/90 in past 12 months     BP Readings from Last 3 Encounters:   04/15/19 134/80   08/13/18 132/80   07/24/18 139/82                 Passed - Recent (12 mo) or future (30 days) visit within the authorizing provider's specialty     Patient has had an office visit with the authorizing provider or a provider within the authorizing providers department within the previous 12 mos or has a future within next 30 days. See \"Patient Info\" tab in inbasket, or \"Choose Columns\" in Meds & Orders section of the refill encounter.              Passed - Medication is active on med list        Passed - Patient is age 18 or older        Passed - No active pregancy on record        Passed - No positive pregnancy test in past 12 months          "

## 2019-10-28 RX ORDER — LISINOPRIL AND HYDROCHLOROTHIAZIDE 12.5; 2 MG/1; MG/1
TABLET ORAL
Qty: 30 TABLET | Refills: 0 | Status: SHIPPED | OUTPATIENT
Start: 2019-10-28 | End: 2020-01-07

## 2019-10-28 NOTE — TELEPHONE ENCOUNTER
number busy could not leave message to schedule labs and follow up appointment for refills.  Lexy Mares, CMA

## 2019-12-15 ENCOUNTER — HEALTH MAINTENANCE LETTER (OUTPATIENT)
Age: 69
End: 2019-12-15

## 2020-01-07 ENCOUNTER — OFFICE VISIT (OUTPATIENT)
Dept: INTERNAL MEDICINE | Facility: CLINIC | Age: 70
End: 2020-01-07
Payer: COMMERCIAL

## 2020-01-07 VITALS
TEMPERATURE: 97.3 F | OXYGEN SATURATION: 95 % | HEART RATE: 93 BPM | DIASTOLIC BLOOD PRESSURE: 78 MMHG | BODY MASS INDEX: 47.24 KG/M2 | SYSTOLIC BLOOD PRESSURE: 126 MMHG | HEIGHT: 62 IN | WEIGHT: 256.7 LBS

## 2020-01-07 DIAGNOSIS — I10 ESSENTIAL HYPERTENSION: ICD-10-CM

## 2020-01-07 DIAGNOSIS — E66.01 MORBID OBESITY (H): ICD-10-CM

## 2020-01-07 DIAGNOSIS — Z00.00 MEDICARE ANNUAL WELLNESS VISIT, INITIAL: Primary | ICD-10-CM

## 2020-01-07 DIAGNOSIS — Z23 NEED FOR VACCINATION: ICD-10-CM

## 2020-01-07 DIAGNOSIS — L40.50 PSORIATIC ARTHRITIS (H): ICD-10-CM

## 2020-01-07 DIAGNOSIS — Z78.0 POSTMENOPAUSAL STATUS: ICD-10-CM

## 2020-01-07 DIAGNOSIS — Z72.0 TOBACCO ABUSE: ICD-10-CM

## 2020-01-07 DIAGNOSIS — E11.9 TYPE 2 DIABETES MELLITUS WITHOUT COMPLICATION, WITHOUT LONG-TERM CURRENT USE OF INSULIN (H): ICD-10-CM

## 2020-01-07 LAB
ANION GAP SERPL CALCULATED.3IONS-SCNC: 7 MMOL/L (ref 3–14)
BUN SERPL-MCNC: 23 MG/DL (ref 7–30)
CALCIUM SERPL-MCNC: 9.9 MG/DL (ref 8.5–10.1)
CHLORIDE SERPL-SCNC: 104 MMOL/L (ref 94–109)
CHOLEST SERPL-MCNC: 110 MG/DL
CO2 SERPL-SCNC: 25 MMOL/L (ref 20–32)
CREAT SERPL-MCNC: 0.8 MG/DL (ref 0.52–1.04)
GFR SERPL CREATININE-BSD FRML MDRD: 74 ML/MIN/{1.73_M2}
GLUCOSE SERPL-MCNC: 130 MG/DL (ref 70–99)
HBA1C MFR BLD: 5.9 % (ref 0–5.6)
HDLC SERPL-MCNC: 51 MG/DL
LDLC SERPL CALC-MCNC: 41 MG/DL
NONHDLC SERPL-MCNC: 59 MG/DL
POTASSIUM SERPL-SCNC: 3.9 MMOL/L (ref 3.4–5.3)
SODIUM SERPL-SCNC: 136 MMOL/L (ref 133–144)
TRIGL SERPL-MCNC: 91 MG/DL

## 2020-01-07 PROCEDURE — 99397 PER PM REEVAL EST PAT 65+ YR: CPT | Mod: 25 | Performed by: INTERNAL MEDICINE

## 2020-01-07 PROCEDURE — 80048 BASIC METABOLIC PNL TOTAL CA: CPT | Performed by: INTERNAL MEDICINE

## 2020-01-07 PROCEDURE — G0008 ADMIN INFLUENZA VIRUS VAC: HCPCS | Performed by: INTERNAL MEDICINE

## 2020-01-07 PROCEDURE — 80061 LIPID PANEL: CPT | Performed by: INTERNAL MEDICINE

## 2020-01-07 PROCEDURE — 90662 IIV NO PRSV INCREASED AG IM: CPT | Performed by: INTERNAL MEDICINE

## 2020-01-07 PROCEDURE — 90732 PPSV23 VACC 2 YRS+ SUBQ/IM: CPT | Performed by: INTERNAL MEDICINE

## 2020-01-07 PROCEDURE — G0009 ADMIN PNEUMOCOCCAL VACCINE: HCPCS | Performed by: INTERNAL MEDICINE

## 2020-01-07 PROCEDURE — 99214 OFFICE O/P EST MOD 30 MIN: CPT | Mod: 25 | Performed by: INTERNAL MEDICINE

## 2020-01-07 PROCEDURE — 83036 HEMOGLOBIN GLYCOSYLATED A1C: CPT | Performed by: INTERNAL MEDICINE

## 2020-01-07 PROCEDURE — 36415 COLL VENOUS BLD VENIPUNCTURE: CPT | Performed by: INTERNAL MEDICINE

## 2020-01-07 RX ORDER — ATORVASTATIN CALCIUM 20 MG/1
TABLET, FILM COATED ORAL
Qty: 90 TABLET | Refills: 3 | Status: SHIPPED | OUTPATIENT
Start: 2020-01-07 | End: 2020-04-09

## 2020-01-07 RX ORDER — LISINOPRIL AND HYDROCHLOROTHIAZIDE 12.5; 2 MG/1; MG/1
TABLET ORAL
Qty: 90 TABLET | Refills: 3 | Status: SHIPPED | OUTPATIENT
Start: 2020-01-07 | End: 2020-04-09

## 2020-01-07 ASSESSMENT — PATIENT HEALTH QUESTIONNAIRE - PHQ9: SUM OF ALL RESPONSES TO PHQ QUESTIONS 1-9: 8

## 2020-01-07 ASSESSMENT — MIFFLIN-ST. JEOR: SCORE: 1624.69

## 2020-01-07 ASSESSMENT — ACTIVITIES OF DAILY LIVING (ADL): CURRENT_FUNCTION: NO ASSISTANCE NEEDED

## 2020-01-07 NOTE — NURSING NOTE
"Chief Complaint   Patient presents with     Medicare Visit     pt is fasting     /78   Pulse 93   Temp 97.3  F (36.3  C) (Temporal)   Ht 1.562 m (5' 1.5\")   Wt 116.4 kg (256 lb 11.2 oz)   LMP 04/11/2000   SpO2 95%   BMI 47.72 kg/m   Estimated body mass index is 47.72 kg/m  as calculated from the following:    Height as of this encounter: 1.562 m (5' 1.5\").    Weight as of this encounter: 116.4 kg (256 lb 11.2 oz).         Health Maintenance due pending provider review:  Mammogram, phq    Aware due, phq given    Lala Schulz CMA  "

## 2020-01-07 NOTE — PROGRESS NOTES
"SUBJECTIVE:   Josefina Maldonado is a 69 year old female who presents for Preventive Visit.      Are you in the first 12 months of your Medicare coverage?  No    Healthy Habits:    In general, how would you rate your overall health?  Good    Frequency of exercise:  None    Do you usually eat at least 4 servings of fruit and vegetables a day, include whole grains    & fiber and avoid regularly eating high fat or \"junk\" foods?  No    Taking medications regularly:  No    Barriers to taking medications:  None    Medication side effects:  None    Ability to successfully perform activities of daily living:  No assistance needed    Home Safety:  No safety concerns identified    Hearing Impairment:  No hearing concerns    In the past 6 months, have you been bothered by leaking of urine? Yes    In general, how would you rate your overall mental or emotional health?  Good      PHQ-2 Total Score:    Additional concerns today:  No    Do you feel safe in your environment? YES    Have you ever done Advance Care Planning? (For example, a Health Directive, POLST, or a discussion with a medical provider or your loved ones about your wishes): No, advance care planning information given to patient to review.  Patient plans to discuss their wishes with loved ones or provider.        Fall risk  Fallen 2 or more times in the past year?: No  Any fall with injury in the past year?: No    Cognitive Screening   1) Repeat 3 items (Leader, Season, Table)    2) Clock draw: NORMAL  3) 3 item recall: Recalls 3 objects  Results: 3 items recalled: COGNITIVE IMPAIRMENT LESS LIKELY    Mini-CogTM Copyright BRE Carrington. Licensed by the author for use in [facility]; reprinted with permission (austin@.[facility]). All rights reserved.      Do you have sleep apnea, excessive snoring or daytime drowsiness?: no    Reviewed and updated as needed this visit by clinical staff  Tobacco  Allergies  Meds  Med Hx  Surg Hx  Fam Hx  Soc Hx        Reviewed and " updated as needed this visit by Provider        Social History     Tobacco Use     Smoking status: Current Every Day Smoker     Packs/day: 1.00     Years: 50.00     Pack years: 50.00     Smokeless tobacco: Never Used     Tobacco comment: vaping   Substance Use Topics     Alcohol use: No     If you drink alcohol do you typically have >3 drinks per day or >7 drinks per week? No    Alcohol Use 1/7/2020   Prescreen: >3 drinks/day or >7 drinks/week? -   Prescreen: >3 drinks/day or >7 drinks/week? No           Diabetes Follow-up      How often are you checking your blood sugar? Once a week    What concerns do you have today about your diabetes? None     Do you have any of these symptoms? (Select all that apply)  No numbness or tingling in feet.  No redness, sores or blisters on feet.  No complaints of excessive thirst.  No reports of blurry vision.  No significant changes to weight.     Have you had a diabetic eye exam in the last 12 months? No      BP Readings from Last 2 Encounters:   01/07/20 126/78   04/15/19 134/80     Hemoglobin A1C (%)   Date Value   04/15/2019 5.8 (H)   08/13/2018 5.7 (H)     LDL Cholesterol Calculated (mg/dL)   Date Value   08/13/2018 37   04/19/2017 94       Diabetes Management Resources      Current providers sharing in care for this patient include:   Patient Care Team:  Virgil Goodson MD as PCP - General (Internal Medicine)  Virgil Goodson MD as Assigned PCP    The following health maintenance items are reviewed in Epic and correct as of today:  Health Maintenance   Topic Date Due     EYE EXAM  1950     MAMMO SCREENING  08/27/1992     ZOSTER IMMUNIZATION (1 of 2) 10/26/1998     ADVANCE CARE PLANNING  12/08/2016     DTAP/TDAP/TD IMMUNIZATION (2 - Td) 06/24/2018     PHQ-9  08/06/2018     PNEUMOCOCCAL IMMUNIZATION 65+ LOW/MEDIUM RISK (2 of 2 - PPSV23) 02/06/2019     BMP  08/13/2019     LIPID  08/13/2019     MICROALBUMIN  08/13/2019     INFLUENZA VACCINE (1) 09/01/2019     A1C   "10/15/2019     TSH W/FREE T4 REFLEX  02/06/2020     MEDICARE ANNUAL WELLNESS VISIT  04/15/2020     DIABETIC FOOT EXAM  04/15/2020     FALL RISK ASSESSMENT  04/15/2020     CBC  04/15/2020     COLONOSCOPY  06/29/2021     DEXA  Completed     HEPATITIS C SCREENING  Completed     IPV IMMUNIZATION  Aged Out     MENINGITIS IMMUNIZATION  Aged Out     Lab work is in process       Review of Systems  Constitutional, HEENT, cardiovascular, pulmonary, gi and gu systems are negative, except as otherwise noted.    OBJECTIVE:   /78   Pulse 93   Temp 97.3  F (36.3  C) (Temporal)   Ht 1.562 m (5' 1.5\")   Wt 116.4 kg (256 lb 11.2 oz)   LMP 04/11/2000   SpO2 95%   BMI 47.72 kg/m   Estimated body mass index is 47.72 kg/m  as calculated from the following:    Height as of this encounter: 1.562 m (5' 1.5\").    Weight as of this encounter: 116.4 kg (256 lb 11.2 oz).  Physical Exam  GENERAL APPEARANCE: alert, no distress and over weight  EYES: Eyes grossly normal to inspection, PERRL and conjunctivae and sclerae normal  HENT: ear canals and TM's normal, nose and mouth without ulcers or lesions, oropharynx clear and oral mucous membranes moist  NECK: no adenopathy, no asymmetry, masses, or scars and thyroid normal to palpation  RESP: lungs clear to auscultation - no rales, rhonchi or wheezes  CV: regular rate and rhythm, normal S1 S2, no S3 or S4, no murmur, click or rub, no peripheral edema and peripheral pulses strong  ABDOMEN: soft, nontender, no hepatosplenomegaly, no masses and bowel sounds normal  MS: no musculoskeletal defects are noted and gait is age appropriate without ataxia  SKIN: Psoriatic plaques plantar surface of both feet.  Pitting and thickness in multiple fingernails.  NEURO: Normal strength and tone, sensory exam grossly normal, mentation intact and speech normal  PSYCH: mentation appears normal and affect normal/bright    Labs reviewed in Epic    ASSESSMENT / PLAN:   1. Medicare annual wellness visit, " "initial  Frankly the patient is not very interested in any preventative care.  I was unable to persuade her to do a mammogram today.  She also is uninterested in lung cancer screening though she qualifies.  She did update her Pneumovax and influenza vaccines.  Instructed to get shingles vaccine from local pharmacy  Weight loss and increase in physical activity encouraged    2. Type 2 diabetes mellitus without complication, without long-term current use of insulin (H)  Repeat labs  - Albumin Random Urine Quantitative with Creat Ratio  - Hemoglobin A1c  - metFORMIN (GLUCOPHAGE) 500 MG tablet; TAKE 1 TABLET  TWICE DAILY WITH MEALS  Dispense: 180 tablet; Refill: 1    3. Psoriatic arthritis (H)  Enbrel on hold due to finances.  Patient follow-up with rheumatology    4. Essential hypertension  Well-controlled continue meds  - lisinopril-hydrochlorothiazide (PRINZIDE/ZESTORETIC) 20-12.5 MG tablet; TAKE 1 TABLET EVERY DAY  Dispense: 90 tablet; Refill: 3    5. Morbid obesity (H)  Weight loss    6. Tobacco abuse  Discussed cessation as well as lung cancer screening not interested  - Okay for Smoking Cessation Study (PLUTO) to Contact Patient    7. Need for vaccination  - Pneumococcal vaccine 23 valent PPSV23  (Pneumovax) [[id number]]  - ADMIN MEDICARE: Pneumococcal Vaccine ()    8. Postmenopausal status  - DX Hip/Pelvis/Spine; Future    COUNSELING:  Reviewed preventive health counseling, as reflected in patient instructions    Estimated body mass index is 47.72 kg/m  as calculated from the following:    Height as of this encounter: 1.562 m (5' 1.5\").    Weight as of this encounter: 116.4 kg (256 lb 11.2 oz).    Weight management plan: Discussed healthy diet and exercise guidelines     reports that she has been smoking. She has a 50.00 pack-year smoking history. She has never used smokeless tobacco.  Tobacco Cessation Action Plan: Information offered: Patient not interested at this time    Appropriate preventive services were " discussed with this patient, including applicable screening as appropriate for cardiovascular disease, diabetes, osteopenia/osteoporosis, and glaucoma.  As appropriate for age/gender, discussed screening for colorectal cancer, prostate cancer, breast cancer, and cervical cancer. Checklist reviewing preventive services available has been given to the patient.    Reviewed patients plan of care and provided an AVS. The Basic Care Plan (routine screening as documented in Health Maintenance) for Josefina meets the Care Plan requirement. This Care Plan has been established and reviewed with the Patient.    Counseling Resources:  ATP IV Guidelines  Pooled Cohorts Equation Calculator  Breast Cancer Risk Calculator  FRAX Risk Assessment  ICSI Preventive Guidelines  Dietary Guidelines for Americans, 2010  USDA's MyPlate  ASA Prophylaxis  Lung CA Screening    Virgil Goodson MD  [facility]    Identified Health Risks:

## 2020-01-07 NOTE — PATIENT INSTRUCTIONS
Patient Education   Personalized Prevention Plan  You are due for the preventive services outlined below.  Your care team is available to assist you in scheduling these services.  If you have already completed any of these items, please share that information with your care team to update in your medical record.  Health Maintenance Due   Topic Date Due     Eye Exam  1950     Mammogram  08/27/1992     Zoster (Shingles) Vaccine (1 of 2) 10/26/1998     Discuss Advance Care Planning  12/08/2016     Diptheria Tetanus Pertussis (DTAP/TDAP/TD) Vaccine (2 - Td) 06/24/2018     Depression Assessment  08/06/2018     Pneumococcal Vaccine (2 of 2 - PPSV23) 02/06/2019     Basic Metabolic Panel  08/13/2019     Cholesterol Lab  08/13/2019     Kidney Microalbumin Urine Test  08/13/2019     Flu Vaccine (1) 09/01/2019     A1C Lab  10/15/2019     Thyroid Function Lab  02/06/2020       Exercise for a Healthier Heart     Exercise with a friend. When activity is fun, you're more likely to stick with it.   You may wonder how you can improve the health of your heart. If you re thinking about exercise, you re on the right track. You don t need to become an athlete, but you do need a certain amount of brisk exercise to help strengthen your heart. If you have been diagnosed with a heart condition, your doctor may recommend exercise to help stabilize your condition. To help make exercise a habit, choose safe, fun activities.  Be sure to check with your healthcare provider before starting an exercise program.  Why exercise?  Exercising regularly offers many healthy rewards. It can help you do all of the following:    Improve your blood cholesterol level to help prevent further heart trouble    Lower your blood pressure to help prevent a stroke or heart attack    Control diabetes, or reduce your risk of getting this disease    Improve your heart and lung function    Reach and maintain a healthy weight    Make your muscles stronger and  more limber so you can stay active    Prevent falls and fractures by slowing the loss of bone mass (osteoporosis)    Manage stress better    Reduce your blood pressure    Improve your sense of self and your body image  Exercise tips  Ease into your routine. Set small goals. Then build on them.  Exercise on most days. Aim for a total of 150 or more minutes of moderate to  vigorous intensity activity each week. Consider 40 minutes, 3 to 4 times a week. For best results, activity should last for 40 minutes on average. It is OK to work up to the 40 minute period over time. Examples of moderate-intensity activity is walking 1 mile in 15 minutes or 30 to 45 minutes of yard work.  Step up your daily activity level. Along with your exercise program, try being more active throughout the day. Walk instead of drive. Do more household tasks or yard work.  Choose one or more activities you enjoy. Walking is one of the easiest things you can do. You can also try swimming, riding a bike, dancing, or taking an exercise class.  Stop exercising and call your doctor if you:    Have chest pain or feel dizzy or lightheaded    Feel burning, tightness, pressure, or heaviness in your chest, neck, shoulders, back, or arms    Have unusual shortness of breath    Have increased joint or muscle pain    Have palpitations or an irregular heartbeat  Date Last Reviewed: 5/1/2016 2000-2019 [employer]. [address] [place] [id number]. All rights reserved. This information is not intended as a substitute for professional medical care. Always follow your healthcare professional's instructions.          Understanding USDA MyPlate  The USDA (U.S. Department of Agriculture) has guidelines to help you make healthy food choices. These are called MyPlate. MyPlate shows the food groups that make up healthy meals using the image of a place setting. Before you eat, think about the healthiest choices for what to put onto your plate or  into your cup or bowl. To learn more about building a healthy plate, visit www.choosemyplate.gov.    The food groups    Fruits. Any fruit or 100% fruit juice counts as part of the Fruit Group. Fruits may be fresh, canned, frozen, or dried, and may be whole, cut-up, or pureed. Make half your plate fruits and vegetables.    Vegetables. Any vegetable or 100% vegetable juice counts as a member of the Vegetable Group. Vegetables may be fresh, frozen, canned, or dried. They can be served raw or cooked and may be whole, cut-up, or mashed. Make half your plate fruits and vegetables.    Grains. All foods made from grains are part of the Grains Group. These include wheat, rice, oats, cornmeal, and barley such as bread, pasta, oatmeal, cereal, tortillas, and grits. Grains should be no more than a quarter of your plate. At least half of your grains should be whole grains.    Protein. This group includes meat, poultry, seafood, beans and peas, eggs, processed soy products (like tofu), nuts (including nut butters), and seeds. Make protein choices no more than a quarter of your plate. Meat and poultry choices should be lean or low fat.    Dairy. All fluid milk products and foods made from milk that contain calcium, like yogurt and cheese, are part of the Dairy Group. (Foods that have little calcium, such as cream, butter, and cream cheese, are not part of the group.) Most dairy choices should be low-fat or fat-free.    Oils. These are fats that are liquid at room temperature. They include canola, corn, olive, soybean, and sunflower oil. Foods that are mainly oil include mayonnaise, certain salad dressings, and soft margarines. You should have only 5 to 7 teaspoons of oils a day. You probably already get this much from the food you eat.  Date Last Reviewed: 8/1/2017 2000-2019 [employer]. [address] [place] [address]. All rights reserved. This information is not intended as a substitute for  professional medical care. Always follow your healthcare professional's instructions.          Urinary Incontinence, Female (Adult)  Urinary incontinence means loss of control of the bladder. This problem affects many women, especially as they get older. If you have incontinence, you may be embarrassed to ask for help. But know that this problem can be treated.  Types of Incontinence  There are different types of incontinence. Two of the main types are described here. You can have more than one type.    Stress incontinence. With this type, urine leaks when pressure (stress) is put on the bladder. This may happen when you cough, sneeze, or laugh. Stress incontinence most often occurs because the pelvic floor muscles that support the bladder and urethra are weak. This can happen after pregnancy and vaginal childbirth or a hysterectomy. It can also be due to excess body weight or hormone changes.    Urge incontinence (also called overactive bladder). With this type, a sudden urge to urinate is felt often. This may happen even though there may not be much urine in the bladder. The need to urinate often during the night is common. Urge incontinence most often occurs because of bladder spasms. This may be due to bladder irritation or infection. Damage to bladder nerves or pelvic muscles, constipation, and certain medicines can also lead to urge incontinence.  Treatment of urinary incontinence depends on the cause. Further evaluation is needed to find the type you have. This will likely include an exam and certain tests. Based on the results, you and your healthcare provider can then plan treatment. Until a diagnosis is made, the home care tips below can help relieve symptoms.  Home care    Do pelvic floor muscle exercises, if they are prescribed. The pelvic floor muscles help support the bladder and urethra. Many women find that their symptoms improve when doing special exercises that strengthen these muscles. To do the  exercises contract the muscles you would use to stop your stream of urine, but do this when you re not urinating. Hold for 10 seconds, then relax. Repeat 10 to 20 times in a row, at least 3 times a day. Your provider may give you other instructions for how to do the exercises and how often.    Keep a bladder diary. This helps track how often and how much you urinate over a set period of time. Bring this diary with you to your next visit with the provider. The information can help your provider learn more about your bladder problem.    Lose weight, if advised to by your provider. Excess weight puts pressure on the bladder. Your provider can help you create a weight-loss plan that s right for you. This may include exercising more and making certain diet changes.    Don't consume foods and drinks that may irritate the bladder. These can include alcohol and caffeinated drinks.    Quit smoking. Smoking and other tobacco use can lead to chronic cough that strains the pelvic floor muscles. Smoking may also damage the bladder and urethra. Talk with your provider about treatments or methods you can use to quit smoking.    If drinking large amounts of fluid causes you to have symptoms, you may be advised to limit your fluid intake. You may also be advised to drink most of your fluids during the day and to limit fluids at night.    If you re worried about urine leakage or accidents, you may wear absorbent pads to catch urine. Change the pads often. This helps reduce discomfort. It may also reduce the risk of skin or bladder infections.  Follow-up care  Follow up with your healthcare provider, or as directed. It may take some to find the right treatment for your problem. Your treatment plan may include special therapies or medicines. Certain procedures or surgery may also be options. Be sure to discuss any questions you have with your provider.  When to seek medical advice  Call the healthcare provider right away if any of  these occur:    Fever of 100.4 F (38 C) or higher, or as directed by your provider    Bladder pain or fullness    Abdominal swelling    Nausea or vomiting    Back pain    Weakness, dizziness or fainting  Date Last Reviewed: 10/1/2017    1297-5430 [employer]. [address] [place] [id number]. All rights reserved. This information is not intended as a substitute for professional medical care. Always follow your healthcare professional's instructions.           Lung Cancer Screening   Frequently Asked Questions  If you are at high-risk for lung cancer, getting screened with low-dose computed tomography (LDCT) every year can help save your life. This handout offers answers to some of the most common questions about lung cancer screening. If you have other questions, please call [phone] ([phone]).     What is it?  Lung cancer screening uses special X-ray technology to create an image of your lung tissue. The exam is quick and easy and takes less than 10 seconds. We don t give you any medicine or use any needles. You can eat before and after the exam. You don t need to change your clothes as long as the clothing on your chest doesn t contain metal. But, you do need to be able to hold your breath for at least 6 seconds during the exam.    What is the goal of lung cancer screening?  The goal of lung cancer screening is to save lives. Many times, lung cancer is not found until a person starts having physical symptoms. Lung cancer screening can help detect lung cancer in the earliest stages when it may be easier to treat.    Who should be screened for lung cancer?  We suggest lung cancer screening for anyone who is at high-risk for lung cancer. You are in the high-risk group if you:      are between the ages of 55 and 79, and    have smoked at least 1 pack of cigarettes a day for 30 or more years, and    still smoke or have quit within the past 15 years.    However, if you have a new  cough or shortness of breath, you should talk to your doctor before being screened.    Some national lung health advocacy groups also recommend screening for people ages 50 to 79 who have smoked an average of 1 pack of cigarettes a day for 20 years. They must also have at least 1 other risk factor for lung cancer, not including exposure to secondhand smoke. Other risk factors are having had cancer in the past, emphysema, pulmonary fibrosis, COPD, a family history of lung cancer, or exposure to certain materials such as arsenic, asbestos, beryllium, cadmium, chromium, diesel fumes, nickel, radon or silica. Your care team can help you know if you have one of these risk factors.     Why does it matter if I have symptoms?  Certain symptoms can be a sign that you have a condition in your lungs that should be checked and treated by your doctor. These symptoms include fever, chest pain, a new or changing cough, shortness of breath that you have never felt before, coughing up blood or unexplained weight loss. Having any of these symptoms can greatly affect the results of lung cancer screening.       Should all smokers get an LDCT lung cancer screening exam?  It depends. Lung cancer screening is for a very specific group of men and women who have a history of heavy smoking over a long period of time (see  Who should be screened for lung cancer  above).  I am in the high-risk group, but have been diagnosed with cancer in the past. Is LDCT lung cancer screening right for me?  In some cases, you should not have LDCT lung screening, such as when your doctor is already following your cancer with CT scan studies. Your doctor will help you decide if LDCT lung screening is right for you.  Do I need to have a screening exam every year?  Yes. If you are in the high-risk group described earlier, you should get an LDCT lung cancer screening exam every year until you are 79, or are no longer willing or able to undergo screening and  possible procedures to diagnose and treat lung cancer.  How effective is LDCT at preventing death from lung cancer?  Studies have shown that LDCT lung cancer screening can lower the risk of death from lung cancer by 20 percent in people who are at high-risk.  What are the risks?  There are some risks and limitations of LDCT lung cancer screening. We want to make sure you understand the risks and benefits, so please let us know if you have any questions. Your doctor may want to talk with you more about these risks.    Radiation exposure: As with any exam that uses radiation, there is a very small increased risk of cancer. The amount of radiation in LDCT is small--about the same amount a person would get from a mammogram. Your doctor orders the exam when he or she feels the potential benefits outweigh the risks.    False negatives: No test is perfect, including LDCT. It is possible that you may have a medical condition, including lung cancer, that is not found during your exam. This is called a false negative result.    False positives and more testing: LDCT very often finds something in the lung that could be cancer, but in fact is not. This is called a false positive result. False positive tests often cause anxiety. To make sure these findings are not cancer, you may need to have more tests. These tests will be done only if you give us permission. Sometimes patients need a treatment that can have side effects, such as a biopsy. For more information on false positives, see  What can I expect from the results?     Findings not related to lung cancer: Your LDCT exam also takes pictures of areas of your body next to your lungs. In a very small number of cases, the CT scan will show an abnormal finding in one of these areas, such as your kidneys, adrenal glands, liver or thyroid. This finding may not be serious, but you may need more tests. Your doctor can help you decide what other tests you may need, if any.  What can  I expect from the results?  About 1 out of 4 LDCT exams will find something that may need more tests. Most of the time, these findings are lung nodules. Lung nodules are very small collections of tissue in the lung. These nodules are very common, and the vast majority--more than 97 percent--are not cancer (benign). Most are normal lymph nodes or small areas of scarring from past infections.  But, if a small lung nodule is found to be cancer, the cancer can be cured more than 90 percent of the time. To know if the nodule is cancer, we may need to get more images before your next yearly screening exam. If the nodule has suspicious features (for example, it is large, has an odd shape or grows over time), we will refer you to a specialist for further testing.  Will my doctor also get the results?  Yes. Your doctor will get a copy of your results.  Is it okay to keep smoking now that there s a cancer screening exam?  No. Tobacco is one of the strongest cancer-causing agents. It causes not only lung cancer, but other cancers and cardiovascular (heart) diseases as well. The damage caused by smoking builds over time. This means that the longer you smoke, the higher your risk of disease. While it is never too late to quit, the sooner you quit, the better.  Where can I find help to quit smoking?  The best way to prevent lung cancer is to stop smoking. If you have already quit smoking, congratulations and keep it up! For help on quitting smoking, please call [employer] at [phone]-WAYE ([phone]) or the American [employer] Society at 1-108.948.4025 to find local resources near you.  One-on-one health coaching:  If you d prefer to work individually with a health care provider on tobacco cessation, we offer:      Medication Therapy Management:  Our specially trained pharmacists work closely with you and your doctor to help you quit smoking.  Call [phone] or [phone] (toll free).     Can Do: Health coaching offered by [facility].  [email or web address]

## 2020-04-09 DIAGNOSIS — I10 ESSENTIAL HYPERTENSION: ICD-10-CM

## 2020-04-09 DIAGNOSIS — E11.9 TYPE 2 DIABETES MELLITUS WITHOUT COMPLICATION, WITHOUT LONG-TERM CURRENT USE OF INSULIN (H): Primary | ICD-10-CM

## 2020-04-09 RX ORDER — LISINOPRIL AND HYDROCHLOROTHIAZIDE 12.5; 2 MG/1; MG/1
TABLET ORAL
Qty: 90 TABLET | Refills: 2 | Status: SHIPPED | OUTPATIENT
Start: 2020-04-09 | End: 2021-02-11

## 2020-04-09 RX ORDER — ATORVASTATIN CALCIUM 20 MG/1
TABLET, FILM COATED ORAL
Qty: 90 TABLET | Refills: 3 | Status: SHIPPED | OUTPATIENT
Start: 2020-04-09 | End: 2021-03-18

## 2020-04-09 NOTE — TELEPHONE ENCOUNTER
"Last Written Prescription Date:  1/7/20  Last Fill Quantity: 90,  # refills: 3   Last office visit: 1/7/2020 with prescribing provider:  1/7/20   Future Office Visit:      Requested Prescriptions   Pending Prescriptions Disp Refills     atorvastatin (LIPITOR) 20 MG tablet [Pharmacy Med Name: ATORVASTATIN CALCIUM 20 MG Tablet] 30 tablet      Sig: TAKE 1 TABLET AT BEDTIME (DUE FOR FASTING LABS AUGUST 2019)       Statins Protocol Passed - 4/9/2020 10:45 AM        Passed - LDL on file in past 12 months     Recent Labs   Lab Test 01/07/20  1010   LDL 41             Passed - No abnormal creatine kinase in past 12 months     No lab results found.             Passed - Recent (12 mo) or future (30 days) visit within the authorizing provider's specialty     Patient has had an office visit with the authorizing provider or a provider within the authorizing providers department within the previous 12 mos or has a future within next 30 days. See \"Patient Info\" tab in inbasket, or \"Choose Columns\" in Meds & Orders section of the refill encounter.              Passed - Medication is active on med list        Passed - Patient is age 18 or older        Passed - No active pregnancy on record        Passed - No positive pregnancy test in past 12 months             "

## 2020-04-09 NOTE — TELEPHONE ENCOUNTER
"Last Written Prescription Date:  1/7/20  Last Fill Quantity: 90,  # refills: 3   Last office visit: 1/7/2020 with prescribing provider:  1/7/20   Future Office Visit:      Requested Prescriptions   Pending Prescriptions Disp Refills     lisinopril-hydrochlorothiazide (ZESTORETIC) 20-12.5 MG tablet [Pharmacy Med Name: LISINOPRIL/HYDROCHLOROTHIAZIDE 20-12.5 MG Tablet] 90 tablet 3     Sig: TAKE 1 TABLET EVERY DAY       Diuretics (Including Combos) Protocol Passed - 4/9/2020 10:45 AM        Passed - Blood pressure under 140/90 in past 12 months     BP Readings from Last 3 Encounters:   01/07/20 126/78   04/15/19 134/80   08/13/18 132/80                 Passed - Recent (12 mo) or future (30 days) visit within the authorizing provider's specialty     Patient has had an office visit with the authorizing provider or a provider within the authorizing providers department within the previous 12 mos or has a future within next 30 days. See \"Patient Info\" tab in inbasket, or \"Choose Columns\" in Meds & Orders section of the refill encounter.              Passed - Medication is active on med list        Passed - Patient is age 18 or older        Passed - No active pregancy on record        Passed - Normal serum creatinine on file in past 12 months     Recent Labs   Lab Test 01/07/20  1010   CR 0.80              Passed - Normal serum potassium on file in past 12 months     Recent Labs   Lab Test 01/07/20  1010   POTASSIUM 3.9                    Passed - Normal serum sodium on file in past 12 months     Recent Labs   Lab Test 01/07/20  1010                 Passed - No positive pregnancy test in past 12 months       ACE Inhibitors (Including Combos) Protocol Passed - 4/9/2020 10:45 AM        Passed - Blood pressure under 140/90 in past 12 months     BP Readings from Last 3 Encounters:   01/07/20 126/78   04/15/19 134/80   08/13/18 132/80                 Passed - Recent (12 mo) or future (30 days) visit within the authorizing " "provider's specialty     Patient has had an office visit with the authorizing provider or a provider within the authorizing providers department within the previous 12 mos or has a future within next 30 days. See \"Patient Info\" tab in inbasket, or \"Choose Columns\" in Meds & Orders section of the refill encounter.              Passed - Medication is active on med list        Passed - Patient is age 18 or older        Passed - No active pregnancy on record        Passed - Normal serum creatinine on file in past 12 months     Recent Labs   Lab Test 01/07/20  1010   CR 0.80       Ok to refill medication if creatinine is low          Passed - Normal serum potassium on file in past 12 months     Recent Labs   Lab Test 01/07/20  1010   POTASSIUM 3.9             Passed - No positive pregnancy test within past 12 months             "

## 2020-04-09 NOTE — TELEPHONE ENCOUNTER
PCP please attach Dx to Atorvastatin, not sure why there is not one attached or available to pull from previous history.    Mckenna TAYLORN, RN, PHN

## 2020-06-22 ENCOUNTER — E-VISIT (OUTPATIENT)
Dept: INTERNAL MEDICINE | Facility: CLINIC | Age: 70
End: 2020-06-22
Payer: COMMERCIAL

## 2020-06-22 DIAGNOSIS — G47.00 INSOMNIA, UNSPECIFIED TYPE: Primary | ICD-10-CM

## 2020-06-22 PROCEDURE — 99421 OL DIG E/M SVC 5-10 MIN: CPT | Performed by: PHYSICIAN ASSISTANT

## 2020-06-22 RX ORDER — TRAZODONE HYDROCHLORIDE 50 MG/1
50-100 TABLET, FILM COATED ORAL
Qty: 30 TABLET | Refills: 1 | Status: SHIPPED | OUTPATIENT
Start: 2020-06-22 | End: 2021-03-18

## 2020-06-22 NOTE — TELEPHONE ENCOUNTER
Provider E-Visit time total (minutes): initial time 6 minutes. + 3 minutes after more information from patient.  Total time 9 minutes.

## 2020-09-05 NOTE — TELEPHONE ENCOUNTER
"Requested Prescriptions   Pending Prescriptions Disp Refills     lisinopril-hydrochlorothiazide (PRINZIDE/ZESTORETIC) 20-12.5 MG per tablet 90 tablet 1     Sig: Take 1 tablet by mouth daily    Diuretics (Including Combos) Protocol Passed    6/22/2018  8:06 PM       Passed - Blood pressure under 140/90 in past 12 months    BP Readings from Last 3 Encounters:   02/06/18 122/70   01/11/18 122/85   05/30/17 108/68                Passed - Recent (12 mo) or future (30 days) visit within the authorizing provider's specialty    Patient had office visit in the last 12 months or has a visit in the next 30 days with authorizing provider or within the authorizing provider's specialty.  See \"Patient Info\" tab in inbasket, or \"Choose Columns\" in Meds & Orders section of the refill encounter.           Passed - Patient is age 18 or older       Passed - No active pregancy on record       Passed - Normal serum creatinine on file in past 12 months    Recent Labs   Lab Test 03/12/18   CR  0.660             Passed - Normal serum potassium on file in past 12 months    Recent Labs   Lab Test  02/06/18   1025   POTASSIUM  4.2                   Passed - Normal serum sodium on file in past 12 months    Recent Labs   Lab Test  02/06/18   1025   NA  140             Passed - No positive pregnancy test in past 12 months   Last Written Prescription Date:  02/06/2018  Last Fill Quantity: 90,  # refills: 01   Last office visit: 2/6/2018 with prescribing provider:  02/06/2018   Future Office Visit:           " Subjective   Patient ID: Joshua is a 28 year old female who presents today for prenatal visit.  She is of 33w5d gestation.    positive fetal movement, No bleeding, No rupture of membranes, No uterine contractions    ASSESSMENT:  Pregnancy at 33w5d weeks gestation.      PLAN:  Follow up visit in 2 weeks  Pro bailey

## 2020-10-15 DIAGNOSIS — E11.65 TYPE 2 DIABETES MELLITUS WITH HYPERGLYCEMIA, WITHOUT LONG-TERM CURRENT USE OF INSULIN (H): ICD-10-CM

## 2020-10-16 RX ORDER — BLOOD SUGAR DIAGNOSTIC
STRIP MISCELLANEOUS
Qty: 100 STRIP | Refills: 3 | Status: SHIPPED | OUTPATIENT
Start: 2020-10-16 | End: 2022-11-18

## 2020-10-16 NOTE — TELEPHONE ENCOUNTER
Medication is being filled for 1 time refill only due to:  Patient needs to be seen because due for diabetes follow up.     Prescription approved per [facility] Refill Protocol.    Mckenna TAYLORN, RN, PHN

## 2020-10-30 DIAGNOSIS — I10 ESSENTIAL HYPERTENSION: ICD-10-CM

## 2020-10-30 RX ORDER — LISINOPRIL AND HYDROCHLOROTHIAZIDE 12.5; 2 MG/1; MG/1
TABLET ORAL
Qty: 90 TABLET | Refills: 2 | OUTPATIENT
Start: 2020-10-30

## 2020-11-05 ENCOUNTER — APPOINTMENT (OUTPATIENT)
Dept: LAB | Facility: CLINIC | Age: 70
End: 2020-11-05
Payer: COMMERCIAL

## 2021-01-15 ENCOUNTER — HEALTH MAINTENANCE LETTER (OUTPATIENT)
Age: 71
End: 2021-01-15

## 2021-02-08 DIAGNOSIS — I10 ESSENTIAL HYPERTENSION: ICD-10-CM

## 2021-02-10 NOTE — TELEPHONE ENCOUNTER
Routing refill request to provider for review/approval because:  Blood pressure out of range   Labs out of range

## 2021-02-11 RX ORDER — LISINOPRIL AND HYDROCHLOROTHIAZIDE 12.5; 2 MG/1; MG/1
TABLET ORAL
Qty: 30 TABLET | Refills: 0 | Status: SHIPPED | OUTPATIENT
Start: 2021-02-11 | End: 2021-03-18

## 2021-02-11 NOTE — TELEPHONE ENCOUNTER
If checks home BP -set up for virtual visit. otherwise pt to f/u with F2F.  If due for AWE/physical schedule this as part of visit as appropriate.

## 2021-03-18 ENCOUNTER — VIRTUAL VISIT (OUTPATIENT)
Dept: INTERNAL MEDICINE | Facility: CLINIC | Age: 71
End: 2021-03-18
Payer: COMMERCIAL

## 2021-03-18 VITALS — DIASTOLIC BLOOD PRESSURE: 65 MMHG | SYSTOLIC BLOOD PRESSURE: 115 MMHG

## 2021-03-18 DIAGNOSIS — E66.01 MORBID OBESITY (H): ICD-10-CM

## 2021-03-18 DIAGNOSIS — I10 ESSENTIAL HYPERTENSION: ICD-10-CM

## 2021-03-18 DIAGNOSIS — L40.50 PSORIATIC ARTHRITIS (H): ICD-10-CM

## 2021-03-18 DIAGNOSIS — E11.9 TYPE 2 DIABETES MELLITUS WITHOUT COMPLICATION, WITHOUT LONG-TERM CURRENT USE OF INSULIN (H): Primary | ICD-10-CM

## 2021-03-18 PROBLEM — E11.65 TYPE 2 DIABETES MELLITUS WITH HYPERGLYCEMIA, WITHOUT LONG-TERM CURRENT USE OF INSULIN (H): Status: RESOLVED | Noted: 2017-04-19 | Resolved: 2021-03-18

## 2021-03-18 PROCEDURE — 99214 OFFICE O/P EST MOD 30 MIN: CPT | Mod: 95 | Performed by: INTERNAL MEDICINE

## 2021-03-18 RX ORDER — ATORVASTATIN CALCIUM 20 MG/1
TABLET, FILM COATED ORAL
Qty: 90 TABLET | Refills: 0 | Status: SHIPPED | OUTPATIENT
Start: 2021-03-18 | End: 2021-07-16

## 2021-03-18 RX ORDER — LISINOPRIL AND HYDROCHLOROTHIAZIDE 12.5; 2 MG/1; MG/1
TABLET ORAL
Qty: 90 TABLET | Refills: 0 | Status: SHIPPED | OUTPATIENT
Start: 2021-03-18 | End: 2021-06-09

## 2021-03-18 NOTE — PROGRESS NOTES
Josefina is a 70 year old who is being evaluated via a billable video visit.      How would you like to obtain your AVS? MyChart  If the video visit is dropped, the invitation should be resent by: Text to cell phone: [phone]  Will anyone else be joining your video visit? No      Video Start Time: 222    Assessment & Plan     Type 2 diabetes mellitus without complication, without long-term current use of insulin (H)  Appears to be under good control.  Continue her meds and get labs checked.  - metFORMIN (GLUCOPHAGE) 500 MG tablet; TAKE 1 TABLET  TWICE DAILY WITH MEALS  - atorvastatin (LIPITOR) 20 MG tablet; TAKE 1 TABLET AT BEDTIME  - ALT; Future  - Albumin Random Urine Quantitative with Creat Ratio; Future  - Hemoglobin A1c; Future  - Lipid panel reflex to direct LDL Fasting; Future  - Basic metabolic panel; Future    Essential hypertension  Home readings seem to be quite good.  Continue her current meds  - lisinopril-hydrochlorothiazide (ZESTORETIC) 20-12.5 MG tablet; Take 1 tablet by mouth once daily    Morbid Obesity  Cont to focus on weight loss             Tobacco Cessation:   reports that she has been smoking. She has a 50.00 pack-year smoking history. She has never used smokeless tobacco.      See Patient Instructions    No follow-ups on file.    Virgil Goodson MD  [facility]   Josefina is a 70 year old who presents for the following health issues     HPI     Diabetes Follow-up    How often are you checking your blood sugar? One time daily- (115- 130)  What time of day are you checking your blood sugars (select all that apply)?  Before meals  Have you had any blood sugars above 200?  No  Have you had any blood sugars below 70?  No    What symptoms do you notice when your blood sugar is low?weak, shaky- very very rare    What concerns do you have today about your diabetes? None     Do you have any of these symptoms? (Select all that apply)  Redness, sores, or  blisters on feet  Due to to psoriatic arthritis    Have you had a diabetic eye exam in the last 12 months? No                Hyperlipidemia Follow-Up      Are you regularly taking any medication or supplement to lower your cholesterol?   Yes-Atorvastatin    Are you having muscle aches or other side effects that you think could be caused by your cholesterol lowering medication?  Yes- always has body aches    Hypertension Follow-up      Do you check your blood pressure regularly outside of the clinic? Yes -occasionally    Are you following a low salt diet? Yes    Are your blood pressures ever more than 140 on the top number (systolic) OR more   than 90 on the bottom number (diastolic), for example 140/90? No    BP Readings from Last 2 Encounters:   01/07/20 126/78   04/15/19 134/80     Hemoglobin A1C (%)   Date Value   01/07/2020 5.9 (H)   04/15/2019 5.8 (H)     LDL Cholesterol Calculated (mg/dL)   Date Value   01/07/2020 41   08/13/2018 37             How many days per week do you miss taking your medication? 0        Review of Systems   Constitutional, HEENT, cardiovascular, pulmonary, gi and gu systems are negative, except as otherwise noted.      Objective           Vitals:  No vitals were obtained today due to virtual visit.    Physical Exam   GENERAL: alert, no distress and over weight  EYES: Eyes grossly normal to inspection.  No discharge or erythema, or obvious scleral/conjunctival abnormalities.  RESP: No audible wheeze, cough, or visible cyanosis.  No visible retractions or increased work of breathing.    SKIN: Visible skin clear. No significant rash, abnormal pigmentation or lesions.  NEURO: Cranial nerves grossly intact.  Mentation and speech appropriate for age.  PSYCH: Mentation appears normal, affect normal/bright, judgement and insight intact, normal speech and appearance well-groomed.                Video-Visit Details    Type of service:  Video Visit    Video End Time:2:44 PM    Originating Location  (pt. Location): Home    Distant Location (provider location):  [facility]     Platform used for Video Visit: Chelly

## 2021-03-20 ENCOUNTER — HEALTH MAINTENANCE LETTER (OUTPATIENT)
Age: 71
End: 2021-03-20

## 2021-04-20 ENCOUNTER — IMMUNIZATION (OUTPATIENT)
Dept: NURSING | Facility: CLINIC | Age: 71
End: 2021-04-20
Payer: COMMERCIAL

## 2021-04-20 PROCEDURE — 91300 PR COVID VAC PFIZER DIL RECON 30 MCG/0.3 ML IM: CPT

## 2021-04-20 PROCEDURE — 0001A PR COVID VAC PFIZER DIL RECON 30 MCG/0.3 ML IM: CPT

## 2021-05-11 ENCOUNTER — IMMUNIZATION (OUTPATIENT)
Dept: NURSING | Facility: CLINIC | Age: 71
End: 2021-05-11
Attending: INTERNAL MEDICINE
Payer: COMMERCIAL

## 2021-05-11 PROCEDURE — 0002A PR COVID VAC PFIZER DIL RECON 30 MCG/0.3 ML IM: CPT

## 2021-05-11 PROCEDURE — 91300 PR COVID VAC PFIZER DIL RECON 30 MCG/0.3 ML IM: CPT

## 2021-06-01 ENCOUNTER — TELEPHONE (OUTPATIENT)
Dept: INTERNAL MEDICINE | Facility: CLINIC | Age: 71
End: 2021-06-01
Payer: COMMERCIAL

## 2021-06-01 ENCOUNTER — MYC MEDICAL ADVICE (OUTPATIENT)
Dept: INTERNAL MEDICINE | Facility: CLINIC | Age: 71
End: 2021-06-01

## 2021-06-01 NOTE — TELEPHONE ENCOUNTER
Patient Quality Outreach      Summary:    Patient has the following on her problem list/HM: None    Patient is due/failing the following:   Colonoscopy, Breast Cancer Screening - Mammogram and Annual wellness, date due: due    Type of outreach:    Sent [employer] message.    Questions for provider review:    None                                                                                                                                     Lala Schulz CMA       Chart routed to .

## 2021-07-12 ENCOUNTER — MYC MEDICAL ADVICE (OUTPATIENT)
Dept: INTERNAL MEDICINE | Facility: CLINIC | Age: 71
End: 2021-07-12

## 2021-07-12 DIAGNOSIS — I10 ESSENTIAL HYPERTENSION: ICD-10-CM

## 2021-07-12 DIAGNOSIS — E11.9 TYPE 2 DIABETES MELLITUS WITHOUT COMPLICATION, WITHOUT LONG-TERM CURRENT USE OF INSULIN (H): ICD-10-CM

## 2021-07-13 ENCOUNTER — TRANSFERRED RECORDS (OUTPATIENT)
Dept: HEALTH INFORMATION MANAGEMENT | Facility: CLINIC | Age: 71
End: 2021-07-13

## 2021-07-16 RX ORDER — ATORVASTATIN CALCIUM 20 MG/1
TABLET, FILM COATED ORAL
Qty: 30 TABLET | Refills: 0 | Status: SHIPPED | OUTPATIENT
Start: 2021-07-16 | End: 2021-08-12

## 2021-07-16 RX ORDER — LISINOPRIL AND HYDROCHLOROTHIAZIDE 12.5; 2 MG/1; MG/1
1 TABLET ORAL DAILY
Qty: 30 TABLET | Refills: 0 | Status: SHIPPED | OUTPATIENT
Start: 2021-07-16 | End: 2021-08-12

## 2021-07-16 NOTE — TELEPHONE ENCOUNTER
Prescription approved per [facility] Refill Protocol. 30 day chelsy refill    Michelle Carnes, MSN, RN

## 2021-07-26 ENCOUNTER — LAB (OUTPATIENT)
Dept: LAB | Facility: CLINIC | Age: 71
End: 2021-07-26
Payer: COMMERCIAL

## 2021-07-26 ENCOUNTER — DOCUMENTATION ONLY (OUTPATIENT)
Dept: LAB | Facility: CLINIC | Age: 71
End: 2021-07-26

## 2021-07-26 DIAGNOSIS — E11.9 TYPE 2 DIABETES MELLITUS WITHOUT COMPLICATION, WITHOUT LONG-TERM CURRENT USE OF INSULIN (H): ICD-10-CM

## 2021-07-26 LAB
ALT SERPL W P-5'-P-CCNC: 34 U/L (ref 0–50)
ANION GAP SERPL CALCULATED.3IONS-SCNC: 9 MMOL/L (ref 3–14)
BUN SERPL-MCNC: 29 MG/DL (ref 7–30)
CALCIUM SERPL-MCNC: 9.8 MG/DL (ref 8.5–10.1)
CHLORIDE BLD-SCNC: 104 MMOL/L (ref 94–109)
CHOLEST SERPL-MCNC: 119 MG/DL
CO2 SERPL-SCNC: 24 MMOL/L (ref 20–32)
CREAT SERPL-MCNC: 0.86 MG/DL (ref 0.52–1.04)
CREAT UR-MCNC: 98 MG/DL
FASTING STATUS PATIENT QL REPORTED: YES
GFR SERPL CREATININE-BSD FRML MDRD: 68 ML/MIN/1.73M2
GLUCOSE BLD-MCNC: 121 MG/DL (ref 70–99)
HBA1C MFR BLD: 6.1 % (ref 0–5.6)
HDLC SERPL-MCNC: 51 MG/DL
LDLC SERPL CALC-MCNC: 47 MG/DL
MICROALBUMIN UR-MCNC: 22 MG/DL
MICROALBUMIN/CREAT UR: 22.45 MG/G CR (ref 0–25)
NONHDLC SERPL-MCNC: 68 MG/DL
POTASSIUM BLD-SCNC: 4.2 MMOL/L (ref 3.4–5.3)
SODIUM SERPL-SCNC: 137 MMOL/L (ref 133–144)
TRIGL SERPL-MCNC: 107 MG/DL

## 2021-07-26 PROCEDURE — 80048 BASIC METABOLIC PNL TOTAL CA: CPT

## 2021-07-26 PROCEDURE — 84460 ALANINE AMINO (ALT) (SGPT): CPT

## 2021-07-26 PROCEDURE — 80061 LIPID PANEL: CPT

## 2021-07-26 PROCEDURE — 36415 COLL VENOUS BLD VENIPUNCTURE: CPT

## 2021-07-26 PROCEDURE — 82043 UR ALBUMIN QUANTITATIVE: CPT

## 2021-07-26 PROCEDURE — 83036 HEMOGLOBIN GLYCOSYLATED A1C: CPT

## 2021-07-26 NOTE — PROGRESS NOTES
Please place or confirm orders for upcoming lab appointment on 08/05/2021. Thank you.    Please place new standing POCT INR order. Thank you.

## 2021-08-11 DIAGNOSIS — E11.9 TYPE 2 DIABETES MELLITUS WITHOUT COMPLICATION, WITHOUT LONG-TERM CURRENT USE OF INSULIN (H): ICD-10-CM

## 2021-08-11 DIAGNOSIS — I10 ESSENTIAL HYPERTENSION: ICD-10-CM

## 2021-08-12 DIAGNOSIS — E11.9 TYPE 2 DIABETES MELLITUS WITHOUT COMPLICATION, WITHOUT LONG-TERM CURRENT USE OF INSULIN (H): ICD-10-CM

## 2021-08-12 RX ORDER — LISINOPRIL AND HYDROCHLOROTHIAZIDE 12.5; 2 MG/1; MG/1
TABLET ORAL
Qty: 30 TABLET | Refills: 8 | Status: SHIPPED | OUTPATIENT
Start: 2021-08-12 | End: 2022-03-14

## 2021-08-12 RX ORDER — ATORVASTATIN CALCIUM 20 MG/1
TABLET, FILM COATED ORAL
Qty: 30 TABLET | Refills: 8 | Status: SHIPPED | OUTPATIENT
Start: 2021-08-12 | End: 2022-03-15

## 2021-08-12 NOTE — TELEPHONE ENCOUNTER
Prescription approved per [facility] Refill Protocol.  Serafin Ascencio RN  [facility] Triage Nurse

## 2021-08-12 NOTE — TELEPHONE ENCOUNTER
Prescription approved per [facility] Refill Protocol.  Serafin Ascencio RN  [facility] Triage Nurse

## 2021-10-24 ENCOUNTER — HEALTH MAINTENANCE LETTER (OUTPATIENT)
Age: 71
End: 2021-10-24

## 2021-10-25 ENCOUNTER — TRANSFERRED RECORDS (OUTPATIENT)
Dept: HEALTH INFORMATION MANAGEMENT | Facility: CLINIC | Age: 71
End: 2021-10-25
Payer: COMMERCIAL

## 2021-10-25 LAB
ALT SERPL-CCNC: 33 LU/L (ref 5–35)
AST SERPL-CCNC: 26 U/L (ref 5–34)
CREATININE (EXTERNAL): 0.8 MG/DL (ref 0.5–1.3)
HEP C HIM: NORMAL

## 2021-12-06 ENCOUNTER — TRANSFERRED RECORDS (OUTPATIENT)
Dept: HEALTH INFORMATION MANAGEMENT | Facility: CLINIC | Age: 71
End: 2021-12-06
Payer: COMMERCIAL

## 2021-12-06 LAB
ALT SERPL-CCNC: 37 IU/L (ref 5–35)
AST SERPL-CCNC: 36 U/L (ref 5–34)
CREATININE (EXTERNAL): 0.93 MG/DL (ref 0.5–1.3)
GFR ESTIMATED (EXTERNAL): 62.8 ML/MIN/1.73M2

## 2022-01-03 ENCOUNTER — TRANSFERRED RECORDS (OUTPATIENT)
Dept: HEALTH INFORMATION MANAGEMENT | Facility: CLINIC | Age: 72
End: 2022-01-03
Payer: COMMERCIAL

## 2022-01-03 LAB
ALT SERPL-CCNC: 37 IU/L (ref 5–35)
ALT SERPL-CCNC: 51 IU/L (ref 5–35)
AST SERPL-CCNC: 36 U/L (ref 5–34)
AST SERPL-CCNC: 38 U/L (ref 5–34)
CREATININE (EXTERNAL): 0.93 MG/DL (ref 0.5–1.3)

## 2022-01-04 ENCOUNTER — MYC MEDICAL ADVICE (OUTPATIENT)
Dept: INTERNAL MEDICINE | Facility: CLINIC | Age: 72
End: 2022-01-04
Payer: COMMERCIAL

## 2022-02-13 ENCOUNTER — HEALTH MAINTENANCE LETTER (OUTPATIENT)
Age: 72
End: 2022-02-13

## 2022-02-21 ENCOUNTER — TRANSFERRED RECORDS (OUTPATIENT)
Dept: HEALTH INFORMATION MANAGEMENT | Facility: CLINIC | Age: 72
End: 2022-02-21
Payer: COMMERCIAL

## 2022-02-21 LAB
ALT SERPL-CCNC: 40 IU/L (ref 5–35)
AST SERPL-CCNC: 35 U/L (ref 5–34)
CREATININE (EXTERNAL): 0.85 MG/DL (ref 0.5–1.3)

## 2022-03-14 DIAGNOSIS — E11.9 TYPE 2 DIABETES MELLITUS WITHOUT COMPLICATION, WITHOUT LONG-TERM CURRENT USE OF INSULIN (H): ICD-10-CM

## 2022-03-14 DIAGNOSIS — I10 ESSENTIAL HYPERTENSION: ICD-10-CM

## 2022-03-15 RX ORDER — LISINOPRIL AND HYDROCHLOROTHIAZIDE 12.5; 2 MG/1; MG/1
1 TABLET ORAL DAILY
Qty: 90 TABLET | Refills: 1 | Status: SHIPPED | OUTPATIENT
Start: 2022-03-15 | End: 2022-11-08

## 2022-03-15 RX ORDER — ATORVASTATIN CALCIUM 20 MG/1
TABLET, FILM COATED ORAL
Qty: 90 TABLET | Refills: 1 | Status: SHIPPED | OUTPATIENT
Start: 2022-03-15 | End: 2022-11-08

## 2022-03-15 NOTE — TELEPHONE ENCOUNTER
metFORMIN (GLUCOPHAGE) 500 MG tablet  Medication is being filled for 1 time refill only due to:  Patient needs to be seen because due for 6 month diabetes check.    atorvastatin (LIPITOR) 20 MG tablet  Prescription approved per [facility] Refill Protocol.    lisinopril-hydrochlorothiazide (ZESTORETIC) 20-12.5 MG tablet  Prescription approved per [facility] Refill Protocol.

## 2022-04-10 ENCOUNTER — HEALTH MAINTENANCE LETTER (OUTPATIENT)
Age: 72
End: 2022-04-10

## 2022-06-06 ENCOUNTER — TRANSFERRED RECORDS (OUTPATIENT)
Dept: HEALTH INFORMATION MANAGEMENT | Facility: CLINIC | Age: 72
End: 2022-06-06
Payer: COMMERCIAL

## 2022-06-06 LAB
ALT SERPL-CCNC: 26 LU/L (ref 5–35)
AST SERPL-CCNC: 22 U/L (ref 5–34)
CREATININE (EXTERNAL): 0.93 MG/DL (ref 0.5–1.3)

## 2022-07-04 ENCOUNTER — MYC MEDICAL ADVICE (OUTPATIENT)
Dept: INTERNAL MEDICINE | Facility: CLINIC | Age: 72
End: 2022-07-04

## 2022-07-06 NOTE — TELEPHONE ENCOUNTER
Patient Contact    Attempt # 1    Was call answered?  No.  Left message on voicemail with information to call me back.    Upon call back, please triage patient for possible UTI symptoms.    Jane Solano RN

## 2022-07-12 ENCOUNTER — OFFICE VISIT (OUTPATIENT)
Dept: URGENT CARE | Facility: URGENT CARE | Age: 72
End: 2022-07-12
Payer: COMMERCIAL

## 2022-07-12 VITALS — SYSTOLIC BLOOD PRESSURE: 133 MMHG | HEART RATE: 108 BPM | TEMPERATURE: 97.4 F | DIASTOLIC BLOOD PRESSURE: 88 MMHG

## 2022-07-12 DIAGNOSIS — N39.0 ACUTE UTI: Primary | ICD-10-CM

## 2022-07-12 DIAGNOSIS — R30.0 DYSURIA: ICD-10-CM

## 2022-07-12 LAB
ALBUMIN UR-MCNC: 30 MG/DL
APPEARANCE UR: CLEAR
BACTERIA #/AREA URNS HPF: ABNORMAL /HPF
BILIRUB UR QL STRIP: NEGATIVE
COLOR UR AUTO: YELLOW
GLUCOSE UR STRIP-MCNC: NEGATIVE MG/DL
HGB UR QL STRIP: ABNORMAL
KETONES UR STRIP-MCNC: NEGATIVE MG/DL
LEUKOCYTE ESTERASE UR QL STRIP: ABNORMAL
NITRATE UR QL: NEGATIVE
PH UR STRIP: 6 [PH] (ref 5–7)
RBC #/AREA URNS AUTO: ABNORMAL /HPF
SP GR UR STRIP: 1.01 (ref 1–1.03)
SQUAMOUS #/AREA URNS AUTO: ABNORMAL /LPF
UROBILINOGEN UR STRIP-ACNC: 0.2 E.U./DL
WBC #/AREA URNS AUTO: ABNORMAL /HPF

## 2022-07-12 PROCEDURE — 87086 URINE CULTURE/COLONY COUNT: CPT | Performed by: PHYSICIAN ASSISTANT

## 2022-07-12 PROCEDURE — 99213 OFFICE O/P EST LOW 20 MIN: CPT | Performed by: PHYSICIAN ASSISTANT

## 2022-07-12 PROCEDURE — 81001 URINALYSIS AUTO W/SCOPE: CPT | Performed by: PHYSICIAN ASSISTANT

## 2022-07-12 RX ORDER — CEFDINIR 300 MG/1
300 CAPSULE ORAL 2 TIMES DAILY
Qty: 14 CAPSULE | Refills: 0 | Status: SHIPPED | OUTPATIENT
Start: 2022-07-12 | End: 2022-07-19

## 2022-07-12 RX ORDER — FLUCONAZOLE 150 MG/1
TABLET ORAL
Qty: 2 TABLET | Refills: 0 | Status: SHIPPED | OUTPATIENT
Start: 2022-07-12 | End: 2022-11-18

## 2022-07-12 RX ORDER — METHOTREXATE 2.5 MG/1
TABLET ORAL
COMMUNITY
Start: 2022-05-19 | End: 2024-06-20

## 2022-07-12 NOTE — PATIENT INSTRUCTIONS
(N39.0) Acute UTI  (primary encounter diagnosis)  Comment:   Plan: cefdinir (OMNICEF) 300 MG capsule            (R30.0) Dysuria  Comment: consistent with yeast  Plan: UA reflex to Microscopic and Culture, Urine         Microscopic Exam, Urine Culture, fluconazole         (DIFLUCAN) 150 MG tablet

## 2022-07-12 NOTE — PROGRESS NOTES
Patient presents with:  Urgent Care: Burning pain when urinating, urinary frequency and low urine volume for 2 months.     N39.0) Acute UTI  (primary encounter diagnosis)  Comment:   Plan: cefdinir (OMNICEF) 300 MG capsule            (R30.0) Dysuria  Comment: consistent with yeast  Plan: UA reflex to Microscopic and Culture, Urine         Microscopic Exam, Urine Culture, fluconazole         (DIFLUCAN) 150 MG tablet              SUBJECTIVE:   Josefina Maldonado is a 71 year old female who presents today with urinary frequency over the past 2 months, worse now with voiding in small amounts and urgency and dysuria.  Also complains of some vaginal itching, but prefers not to do a wet prep today.      Past Medical History:   Diagnosis Date     Attention deficit disorder 7/27/2004     Problem list name updated by automated process. Provider to review     Chronic depressive personality disorder 1966    has a therapist     Degenerative disc disease      Hypersomnia with sleep apnea, unspecified 2001    uses cpap     Hypertension      Malabsorption of glucose      (Problem list name updated by automated process. Provider to review and confirm.)     Obesity      Psoriatic arthritis (H)      Rheumatic fever without mention of heart involvement 1950         Current Outpatient Medications   Medication Sig Dispense Refill     Multiple Vitamins-Iron (DAILY-GENA/IRON/BETA-CAROTENE) TABS TAKE 1 TABLET BY MOUTH DAILY. (Patient not taking: Reported on 10/19/2020) 30 tablet 7     Social History     Tobacco Use     Smoking status: Never Smoker     Smokeless tobacco: Never Used   Substance Use Topics     Alcohol use: Not on file     Family History   Problem Relation Age of Onset     Diabetes Mother      Diabetes Father          ROS:    10 point ROS of systems including Constitutional, Eyes, Respiratory, Cardiovascular, Gastroenterology, Genitourinary, Integumentary, Muscularskeletal, Psychiatric ,neurological were all negative except for  pertinent positives noted in my HPI       OBJECTIVE:  /88   Pulse 108   Temp 97.4  F (36.3  C) (Tympanic)   LMP 04/11/2000   Physical Exam:  GENERAL APPEARANCE: healthy, alert and no distress  ABDOMEN:  soft, nontender, no HSM or masses and bowel sounds normal  GU_female: deferred  SKIN: no suspicious lesions or rashes  BACK: No CVAT

## 2022-07-14 LAB — BACTERIA UR CULT: NORMAL

## 2022-08-22 DIAGNOSIS — E11.9 TYPE 2 DIABETES MELLITUS WITHOUT COMPLICATION, WITHOUT LONG-TERM CURRENT USE OF INSULIN (H): ICD-10-CM

## 2022-08-25 NOTE — TELEPHONE ENCOUNTER
Routing refill request to provider for review/approval because:  Hemoglobin A1C   Date Value Ref Range Status   07/26/2021 6.1 (H) 0.0 - 5.6 % Final     Comment:     Normal <5.7%   Prediabetes 5.7-6.4%    Diabetes 6.5% or higher     Note: Adopted from ADA consensus guidelines.   01/07/2020 5.9 (H) 0 - 5.6 % Final     Comment:     Normal <5.7% Prediabetes 5.7-6.4%  Diabetes 6.5% or higher - adopted from ADA   consensus guidelines.           Serafin Ascencio, RN  [facility] Triage Nurse

## 2022-10-10 ENCOUNTER — TRANSFERRED RECORDS (OUTPATIENT)
Dept: INTERNAL MEDICINE | Facility: CLINIC | Age: 72
End: 2022-10-10

## 2022-10-10 ENCOUNTER — TRANSFERRED RECORDS (OUTPATIENT)
Dept: HEALTH INFORMATION MANAGEMENT | Facility: CLINIC | Age: 72
End: 2022-10-10

## 2022-10-10 LAB
ALT SERPL-CCNC: 25 IU/L (ref 5–35)
AST SERPL-CCNC: 21 U/L (ref 5–34)
CREATININE (EXTERNAL): 0.92 MG/DL (ref 0.5–1.3)

## 2022-10-16 ENCOUNTER — HEALTH MAINTENANCE LETTER (OUTPATIENT)
Age: 72
End: 2022-10-16

## 2022-10-22 ENCOUNTER — NURSE TRIAGE (OUTPATIENT)
Dept: INTERNAL MEDICINE | Facility: CLINIC | Age: 72
End: 2022-10-22

## 2022-10-22 NOTE — TELEPHONE ENCOUNTER
Reason for Call:  Other prescription    Detailed comments: patient called and would like a prescription for a yeast infection from Kellee Medina at  [facility] wi/o an appointment.    Please contact patient.  Thank you.    Phone Number Patient can be reached at: Home number on file [phone] (home)    Best Time: any    Can we leave a detailed message on this number? YES    Call taken on 10/22/2022 at 10:44 AM by Steph Warner

## 2022-10-26 NOTE — TELEPHONE ENCOUNTER
MC message also sent with providers recommendations.    Patient Contact    Attempt # 1    Was call answered?  No.  Left message on voicemail with information to call me back.

## 2022-10-26 NOTE — TELEPHONE ENCOUNTER
Provider Response to 2nd Level Triage Request    I have reviewed the RN documentation. My recommendation is:  Push fluids  Take a covid test now  If + defer in person visit to a virtual   If - then can be seen in person lilli, here

## 2022-10-27 NOTE — TELEPHONE ENCOUNTER
Patient Contact    Attempt # 2    Was call answered?  No.  Left message on voicemail with information to call me back.    Left detailed message. Informed pt to call back with questions and/or results of COVID test.       Lamar Hernandez RN

## 2022-11-05 ENCOUNTER — OFFICE VISIT (OUTPATIENT)
Dept: URGENT CARE | Facility: URGENT CARE | Age: 72
End: 2022-11-05
Payer: COMMERCIAL

## 2022-11-05 VITALS
SYSTOLIC BLOOD PRESSURE: 119 MMHG | BODY MASS INDEX: 47.4 KG/M2 | OXYGEN SATURATION: 93 % | HEART RATE: 100 BPM | WEIGHT: 255 LBS | DIASTOLIC BLOOD PRESSURE: 79 MMHG | TEMPERATURE: 97.9 F

## 2022-11-05 DIAGNOSIS — R30.0 DYSURIA: Primary | ICD-10-CM

## 2022-11-05 LAB
ALBUMIN UR-MCNC: >=300 MG/DL
APPEARANCE UR: ABNORMAL
BACTERIA #/AREA URNS HPF: ABNORMAL /HPF
BILIRUB UR QL STRIP: NEGATIVE
COLOR UR AUTO: YELLOW
GLUCOSE UR STRIP-MCNC: NEGATIVE MG/DL
HGB UR QL STRIP: ABNORMAL
KETONES UR STRIP-MCNC: NEGATIVE MG/DL
LEUKOCYTE ESTERASE UR QL STRIP: ABNORMAL
NITRATE UR QL: POSITIVE
PH UR STRIP: 6.5 [PH] (ref 5–7)
RBC #/AREA URNS AUTO: ABNORMAL /HPF
SP GR UR STRIP: >=1.03 (ref 1–1.03)
SQUAMOUS #/AREA URNS AUTO: ABNORMAL /LPF
UROBILINOGEN UR STRIP-ACNC: 0.2 E.U./DL
WBC #/AREA URNS AUTO: >100 /HPF

## 2022-11-05 PROCEDURE — 87086 URINE CULTURE/COLONY COUNT: CPT | Performed by: NURSE PRACTITIONER

## 2022-11-05 PROCEDURE — 99214 OFFICE O/P EST MOD 30 MIN: CPT | Performed by: NURSE PRACTITIONER

## 2022-11-05 PROCEDURE — 81001 URINALYSIS AUTO W/SCOPE: CPT | Performed by: NURSE PRACTITIONER

## 2022-11-05 RX ORDER — CEPHALEXIN 500 MG/1
500 CAPSULE ORAL 2 TIMES DAILY
Qty: 10 CAPSULE | Refills: 0 | Status: SHIPPED | OUTPATIENT
Start: 2022-11-05 | End: 2022-11-10

## 2022-11-05 RX ORDER — PHENAZOPYRIDINE HYDROCHLORIDE 200 MG/1
200 TABLET, FILM COATED ORAL 3 TIMES DAILY PRN
Qty: 6 TABLET | Refills: 0 | Status: SHIPPED | OUTPATIENT
Start: 2022-11-05 | End: 2022-11-18

## 2022-11-05 NOTE — PROGRESS NOTES
Assessment & Plan     Dysuria  - UA macro with reflex to Microscopic and Culture - Clinc Collect  - Urine Microscopic Exam  - Urine Culture  - phenazopyridine (PYRIDIUM) 200 MG tablet  Dispense: 6 tablet; Refill: 0  - cephALEXin (KEFLEX) 500 MG capsule  Dispense: 10 capsule; Refill: 0     Patient Instructions     Results for orders placed or performed in visit on 11/05/22   UA macro with reflex to Microscopic and Culture - Clinc Collect     Status: Abnormal    Specimen: Urine, Clean Catch   Result Value Ref Range    Color Urine Yellow Colorless, Straw, Light Yellow, Yellow    Appearance Urine Cloudy (A) Clear    Glucose Urine Negative Negative, 1000 , >=2000 mg/dL    Bilirubin Urine Negative Negative    Ketones Urine Negative Negative, 160  mg/dL    Specific Gravity Urine >=1.030 1.003 - 1.035    Blood Urine Large (A) Negative    pH Urine 6.5 5.0 - 7.0    Protein Albumin Urine >=300 (A) Negative, 300 , >=2000 mg/dL    Urobilinogen Urine 0.2 0.2, 1.0 E.U./dL    Nitrite Urine Positive (A) Negative    Leukocyte Esterase Urine Moderate (A) Negative   Urine Microscopic Exam     Status: Abnormal   Result Value Ref Range    Bacteria Urine Moderate (A) None Seen /HPF    RBC Urine 5-10 (A) 0-2 /HPF /HPF    WBC Urine >100 (A) 0-5 /HPF /HPF    Squamous Epithelials Urine Few (A) None Seen /LPF    Narrative    Microscopic exam performed on unspun urine.      UC pending  Push fluids  Pyridium PRN - urine will be dark orange   Antibiotics keflex for 5 days  Will call if bacteria is resistant to the antibiotic prescribed.    F/u in 1 week if persists or 3 days if worsening.           Return in about 1 week (around 11/12/2022) for with regular provider if symptoms persist.    KATARZYNA Mascorro [facility] KAVITHA Rocha is a 72 year old female who presents to clinic today for the following health issues:  Chief Complaint   Patient presents with     UTI     2 weeks       HPI    UTI    Onset of symptoms was 2week(s).  Course of illness is same  Severity moderate  Current and associated symptoms dysuria, frequency and urgency  Treatment and measures tried Cranberry juice and Increase fluid intake  Predisposing factors include none  Patient denies rigors, flank pain, temperature > 101 degrees F., taking Coumadin, vaginal discharge, vaginal odor and vaginal itching          Review of Systems  Constitutional, HEENT, cardiovascular, pulmonary, GI, , musculoskeletal, neuro, skin, endocrine and psych systems are negative, except as otherwise noted.      Objective    /79   Pulse 100   Temp 97.9  F (36.6  C) (Tympanic)   Wt 115.7 kg (255 lb)   LMP 04/11/2000   SpO2 93%   BMI 47.40 kg/m    Physical Exam   GENERAL: healthy, alert and no distress  NECK: no adenopathy, no asymmetry, masses, or scars and thyroid normal to palpation  RESP: lungs clear to auscultation - no rales, rhonchi or wheezes  CV: regular rate and rhythm, normal S1 S2, no S3 or S4, no murmur, click or rub, no peripheral edema and peripheral pulses strong  ABDOMEN: soft, nontender, no hepatosplenomegaly, no masses and bowel sounds normal  MS: no gross musculoskeletal defects noted, no edema  BACK: no CVA tenderness, no paralumbar tenderness

## 2022-11-05 NOTE — PATIENT INSTRUCTIONS
Results for orders placed or performed in visit on 11/05/22   UA macro with reflex to Microscopic and Culture - Clinc Collect     Status: Abnormal    Specimen: Urine, Clean Catch   Result Value Ref Range    Color Urine Yellow Colorless, Straw, Light Yellow, Yellow    Appearance Urine Cloudy (A) Clear    Glucose Urine Negative Negative, 1000 , >=2000 mg/dL    Bilirubin Urine Negative Negative    Ketones Urine Negative Negative, 160  mg/dL    Specific Gravity Urine >=1.030 1.003 - 1.035    Blood Urine Large (A) Negative    pH Urine 6.5 5.0 - 7.0    Protein Albumin Urine >=300 (A) Negative, 300 , >=2000 mg/dL    Urobilinogen Urine 0.2 0.2, 1.0 E.U./dL    Nitrite Urine Positive (A) Negative    Leukocyte Esterase Urine Moderate (A) Negative   Urine Microscopic Exam     Status: Abnormal   Result Value Ref Range    Bacteria Urine Moderate (A) None Seen /HPF    RBC Urine 5-10 (A) 0-2 /HPF /HPF    WBC Urine >100 (A) 0-5 /HPF /HPF    Squamous Epithelials Urine Few (A) None Seen /LPF    Narrative    Microscopic exam performed on unspun urine.      UC pending  Push fluids  Pyridium PRN - urine will be dark orange   Antibiotics keflex for 5 days  Will call if bacteria is resistant to the antibiotic prescribed.    F/u in 1 week if persists or 3 days if worsening.

## 2022-11-06 DIAGNOSIS — I10 ESSENTIAL HYPERTENSION: ICD-10-CM

## 2022-11-06 DIAGNOSIS — E11.9 TYPE 2 DIABETES MELLITUS WITHOUT COMPLICATION, WITHOUT LONG-TERM CURRENT USE OF INSULIN (H): ICD-10-CM

## 2022-11-07 DIAGNOSIS — R30.0 DYSURIA: ICD-10-CM

## 2022-11-07 LAB — BACTERIA UR CULT: NORMAL

## 2022-11-08 RX ORDER — LISINOPRIL AND HYDROCHLOROTHIAZIDE 12.5; 2 MG/1; MG/1
TABLET ORAL
Qty: 90 TABLET | Refills: 0 | Status: SHIPPED | OUTPATIENT
Start: 2022-11-08 | End: 2022-11-18

## 2022-11-08 RX ORDER — PHENAZOPYRIDINE HYDROCHLORIDE 200 MG/1
TABLET, FILM COATED ORAL
Qty: 6 TABLET | Refills: 0 | OUTPATIENT
Start: 2022-11-08

## 2022-11-08 RX ORDER — ATORVASTATIN CALCIUM 20 MG/1
TABLET, FILM COATED ORAL
Qty: 90 TABLET | Refills: 0 | Status: SHIPPED | OUTPATIENT
Start: 2022-11-08 | End: 2022-11-18

## 2022-11-08 NOTE — TELEPHONE ENCOUNTER
Routing refill request to provider for review/approval because:  Labs not current:  A1C, creatinine, LDL, potassium, sodium  Last OV 3/2021.    Jane Solano RN

## 2022-11-08 NOTE — TELEPHONE ENCOUNTER
Routing refill request to provider for review/approval because:  Drug not on the [facility] refill protocol       Last Written Prescription Date:  11/5/22 Urgent Care  Last Fill Quantity: 6,  # refills: 0   Last office visit: 11/5/2022 with prescribing provider:     Future Office Visit:    Routing to PCP as Urgent Care providers do not refill meds.  Alison Jacobson RN  [facility]

## 2022-11-09 NOTE — TELEPHONE ENCOUNTER
90d RF- visit needed for any further.    If able- get labs done asap and set up f/u video or in-person

## 2022-11-10 ENCOUNTER — TELEPHONE (OUTPATIENT)
Dept: INTERNAL MEDICINE | Facility: CLINIC | Age: 72
End: 2022-11-10

## 2022-11-10 NOTE — TELEPHONE ENCOUNTER
Patient Contact    Attempt # 1    Was call answered?  No.  Left message on voicemail with information to call me back.    Upon call back: please clarify what the patient is needing. Patient left 2 voicemail's on the Physical Therapy phone line.    Jane Solano RN

## 2022-11-10 NOTE — TELEPHONE ENCOUNTER
----- Message from Leigh Rain sent at 11/10/2022  7:32 AM CST -----  Regarding: FW: Chandler    ----- Message -----  From: Kimberly Porter  Sent: 10/31/2022   7:45 AM CST  To: [facility]  Subject: Chandler                                       The above patient has left 2 voice mails on the Physical Therapy phone line.    Please call patient back ASAP    [phone]

## 2022-11-14 NOTE — TELEPHONE ENCOUNTER
Called patient to clarify any needs. Patient states she had UTI and was attempting to get antibiotics refilled. Patient was evaluated at  11/5/22 and given Keflex.     Patient finished abx 11/11 and is still having sx.  - Increased Urinary frequency  - Urinary hesitancy  - Feeling lower abdominal pressure    Denies dysuria, hematuria, strong odors.    Per 11/5  notes:  Return in about 1 week (around 11/12/2022) for with regular provider if symptoms persist.    Routing to PCP, please advise if patient may use Same Day or Next Day slot to be evaluated for onging UTI symptoms.         Can we leave a detailed message on this number? YES  Phone number patient can be reached at: Home number on file [phone] (home)    Suzanna Mancuso RN  [facility] Triage

## 2022-11-14 NOTE — TELEPHONE ENCOUNTER
Spoke to patient to relay providers recommendation. Patient is scheduled 11/18 next day slot with PCP approval.     Patient would also like to discuss her blood sugars, states she was told by  provider  7/12/22 to stop checking her BG. RN only able to find notes regarding this in  AVS, nothing in visit notes.    Per 7/12/22  AVS:  START taking:  cefdinir (OMNICEF)  fluconazole (DIFLUCAN)  STOP taking:  blood glucose monitoring meter device kit  Accurate as of July 12, 2022 11:18 AM.  Review your updated medication list below.

## 2022-11-15 ENCOUNTER — DOCUMENTATION ONLY (OUTPATIENT)
Dept: LAB | Facility: CLINIC | Age: 72
End: 2022-11-15

## 2022-11-15 NOTE — TELEPHONE ENCOUNTER
Patient was called with provider's message. She had some improvement with Keflex and pyridim, but all the symptoms came back. She currently has pressure in the lower abdomen, frequency and not much coming out, no burning, abdomen seem distended. Fever last night of 99.4, but gone after Advil. URI symptoms for last couple of weeks - well managed at home per patient. Drinking liquids. On 7/12/22 cefdinir (OMNICEF) 300 MG capsule worked well for a UTI. Willing to have  come  a urine cup today. Okay to use 11/3/22 Future UA order?     Pharmacy:  [facility] - [place], [place] - [address] [place] [address] E    Appointments in Next Year    Nov 18, 2022  9:30 AM  (Arrive by 9:10 AM)  Provider Visit with Virgil Goodson MD  [facility] ([facility] ) [phone]   Nov 18, 2022 12:15 PM  LAB with [facility]  [facility] ([facility] ) [phone]

## 2022-11-15 NOTE — PROGRESS NOTES
Please place or confirm orders for upcoming lab appointment on 11/18/2022.    Looks like patient has a Dr. appt at 9:30 am and also has a lab appt same day at 12:15.    Im not sure if he patient plans on coming in early before her appointment time to get labs done before she sees the provider.     Thank you.    Rosa Elena Kumar

## 2022-11-15 NOTE — PROGRESS NOTES
Lab should be 1--2 d before visit rather than same day after appt.   Maybe scheduling mistake?  Call reschedule to prior to in office appt

## 2022-11-18 ENCOUNTER — OFFICE VISIT (OUTPATIENT)
Dept: INTERNAL MEDICINE | Facility: CLINIC | Age: 72
End: 2022-11-18
Payer: COMMERCIAL

## 2022-11-18 ENCOUNTER — ANCILLARY PROCEDURE (OUTPATIENT)
Dept: GENERAL RADIOLOGY | Facility: CLINIC | Age: 72
End: 2022-11-18
Attending: INTERNAL MEDICINE
Payer: COMMERCIAL

## 2022-11-18 VITALS
TEMPERATURE: 97.4 F | HEIGHT: 62 IN | HEART RATE: 111 BPM | WEIGHT: 248.2 LBS | BODY MASS INDEX: 45.67 KG/M2 | DIASTOLIC BLOOD PRESSURE: 76 MMHG | SYSTOLIC BLOOD PRESSURE: 120 MMHG | OXYGEN SATURATION: 94 %

## 2022-11-18 DIAGNOSIS — I10 ESSENTIAL HYPERTENSION: ICD-10-CM

## 2022-11-18 DIAGNOSIS — N39.0 UTI (URINARY TRACT INFECTION) WITH PYURIA: ICD-10-CM

## 2022-11-18 DIAGNOSIS — Z12.11 SCREEN FOR COLON CANCER: ICD-10-CM

## 2022-11-18 DIAGNOSIS — E66.01 MORBID OBESITY (H): ICD-10-CM

## 2022-11-18 DIAGNOSIS — R33.9 URINARY RETENTION WITH INCOMPLETE BLADDER EMPTYING: ICD-10-CM

## 2022-11-18 DIAGNOSIS — R80.9 TYPE 2 DIABETES MELLITUS WITH MICROALBUMINURIA, WITHOUT LONG-TERM CURRENT USE OF INSULIN (H): ICD-10-CM

## 2022-11-18 DIAGNOSIS — Z79.899 ENCOUNTER FOR LONG-TERM (CURRENT) USE OF MEDICATIONS: ICD-10-CM

## 2022-11-18 DIAGNOSIS — E11.9 TYPE 2 DIABETES MELLITUS WITHOUT COMPLICATION, WITHOUT LONG-TERM CURRENT USE OF INSULIN (H): ICD-10-CM

## 2022-11-18 DIAGNOSIS — M75.01 ADHESIVE CAPSULITIS OF RIGHT SHOULDER: ICD-10-CM

## 2022-11-18 DIAGNOSIS — L40.50 PSORIATIC ARTHRITIS (H): ICD-10-CM

## 2022-11-18 DIAGNOSIS — E87.29 METABOLIC ACIDOSIS, INCREASED ANION GAP: ICD-10-CM

## 2022-11-18 DIAGNOSIS — Z12.31 VISIT FOR SCREENING MAMMOGRAM: ICD-10-CM

## 2022-11-18 DIAGNOSIS — E11.29 TYPE 2 DIABETES MELLITUS WITH MICROALBUMINURIA, WITHOUT LONG-TERM CURRENT USE OF INSULIN (H): ICD-10-CM

## 2022-11-18 DIAGNOSIS — R19.7 ACUTE DIARRHEA: ICD-10-CM

## 2022-11-18 DIAGNOSIS — R30.0 DYSURIA: Primary | ICD-10-CM

## 2022-11-18 LAB
ALBUMIN UR-MCNC: 100 MG/DL
ANION GAP SERPL CALCULATED.3IONS-SCNC: 19 MMOL/L (ref 7–15)
APPEARANCE UR: CLEAR
BACTERIA #/AREA URNS HPF: ABNORMAL /HPF
BILIRUB UR QL STRIP: NEGATIVE
BUN SERPL-MCNC: 30.9 MG/DL (ref 8–23)
CALCIUM SERPL-MCNC: 9.8 MG/DL (ref 8.8–10.2)
CHLORIDE SERPL-SCNC: 100 MMOL/L (ref 98–107)
COLOR UR AUTO: YELLOW
CREAT SERPL-MCNC: 1.02 MG/DL (ref 0.51–0.95)
CREAT UR-MCNC: 126 MG/DL
DEPRECATED HCO3 PLAS-SCNC: 16 MMOL/L (ref 22–29)
ERYTHROCYTE [DISTWIDTH] IN BLOOD BY AUTOMATED COUNT: 15.3 % (ref 10–15)
GFR SERPL CREATININE-BSD FRML MDRD: 57 ML/MIN/1.73M2
GLUCOSE SERPL-MCNC: 119 MG/DL (ref 70–99)
GLUCOSE UR STRIP-MCNC: NEGATIVE MG/DL
HBA1C MFR BLD: 6.7 % (ref 0–5.6)
HCT VFR BLD AUTO: 42.5 % (ref 35–47)
HGB BLD-MCNC: 13.2 G/DL (ref 11.7–15.7)
HGB UR QL STRIP: ABNORMAL
KETONES UR STRIP-MCNC: NEGATIVE MG/DL
LEUKOCYTE ESTERASE UR QL STRIP: ABNORMAL
MCH RBC QN AUTO: 30.3 PG (ref 26.5–33)
MCHC RBC AUTO-ENTMCNC: 31.1 G/DL (ref 31.5–36.5)
MCV RBC AUTO: 98 FL (ref 78–100)
MICROALBUMIN UR-MCNC: 902 MG/L
MICROALBUMIN/CREAT UR: 715.87 MG/G CR (ref 0–25)
NITRATE UR QL: NEGATIVE
PH UR STRIP: 5.5 [PH] (ref 5–7)
PLATELET # BLD AUTO: 374 10E3/UL (ref 150–450)
POTASSIUM SERPL-SCNC: 4.6 MMOL/L (ref 3.4–5.3)
RBC # BLD AUTO: 4.35 10E6/UL (ref 3.8–5.2)
RBC #/AREA URNS AUTO: ABNORMAL /HPF
SODIUM SERPL-SCNC: 135 MMOL/L (ref 136–145)
SP GR UR STRIP: >=1.03 (ref 1–1.03)
SQUAMOUS #/AREA URNS AUTO: ABNORMAL /LPF
UROBILINOGEN UR STRIP-ACNC: 0.2 E.U./DL
WBC # BLD AUTO: 9.4 10E3/UL (ref 4–11)
WBC #/AREA URNS AUTO: >100 /HPF

## 2022-11-18 PROCEDURE — 99214 OFFICE O/P EST MOD 30 MIN: CPT | Mod: 25 | Performed by: INTERNAL MEDICINE

## 2022-11-18 PROCEDURE — 86481 TB AG RESPONSE T-CELL SUSP: CPT | Performed by: INTERNAL MEDICINE

## 2022-11-18 PROCEDURE — 73030 X-RAY EXAM OF SHOULDER: CPT | Mod: TC | Performed by: RADIOLOGY

## 2022-11-18 PROCEDURE — 36415 COLL VENOUS BLD VENIPUNCTURE: CPT | Performed by: INTERNAL MEDICINE

## 2022-11-18 PROCEDURE — 82043 UR ALBUMIN QUANTITATIVE: CPT | Performed by: INTERNAL MEDICINE

## 2022-11-18 PROCEDURE — 81001 URINALYSIS AUTO W/SCOPE: CPT | Performed by: INTERNAL MEDICINE

## 2022-11-18 PROCEDURE — 80048 BASIC METABOLIC PNL TOTAL CA: CPT | Performed by: INTERNAL MEDICINE

## 2022-11-18 PROCEDURE — 85027 COMPLETE CBC AUTOMATED: CPT | Performed by: INTERNAL MEDICINE

## 2022-11-18 PROCEDURE — 90471 IMMUNIZATION ADMIN: CPT | Performed by: INTERNAL MEDICINE

## 2022-11-18 PROCEDURE — 83036 HEMOGLOBIN GLYCOSYLATED A1C: CPT | Performed by: INTERNAL MEDICINE

## 2022-11-18 PROCEDURE — 90662 IIV NO PRSV INCREASED AG IM: CPT | Performed by: INTERNAL MEDICINE

## 2022-11-18 PROCEDURE — 80061 LIPID PANEL: CPT | Performed by: INTERNAL MEDICINE

## 2022-11-18 PROCEDURE — 87086 URINE CULTURE/COLONY COUNT: CPT | Performed by: INTERNAL MEDICINE

## 2022-11-18 RX ORDER — CEFDINIR 300 MG/1
300 CAPSULE ORAL 2 TIMES DAILY
Qty: 6 CAPSULE | Refills: 0 | Status: SHIPPED | OUTPATIENT
Start: 2022-11-18 | End: 2024-06-20

## 2022-11-18 RX ORDER — HYDROXYZINE PAMOATE 25 MG/1
25 CAPSULE ORAL DAILY
COMMUNITY
Start: 2022-10-17 | End: 2024-06-20

## 2022-11-18 RX ORDER — LISINOPRIL AND HYDROCHLOROTHIAZIDE 12.5; 2 MG/1; MG/1
1 TABLET ORAL DAILY
Qty: 90 TABLET | Refills: 3 | Status: SHIPPED | OUTPATIENT
Start: 2022-11-18 | End: 2024-01-08

## 2022-11-18 RX ORDER — ATORVASTATIN CALCIUM 20 MG/1
20 TABLET, FILM COATED ORAL AT BEDTIME
Qty: 90 TABLET | Refills: 3 | Status: SHIPPED | OUTPATIENT
Start: 2022-11-18 | End: 2024-01-08

## 2022-11-18 NOTE — PROGRESS NOTES
"  Assessment & Plan     UTI  Dysuria  Urinary retention with incomplete bladder emptying  Will empirically cover her for cystitis due to her pyuria - though her sx seemingly could be related more to organ prolapse/bladder dysfunction. This may also be contributing to the recurrent nature of her infections.   She is on a biologic for PA/psoriasis so immunocompromised to a degree.   -short term empiric antibx-   - Adult Uro/Gyn  Referral; Future    Type 2 diabetes mellitus without complication, without long-term current use of insulin (H)  Recheck - given macroalbuminuria- increase in ACEi dose needed as also consider SGLT2i  - Adult Eye  Referral; Future  - Albumin Random Urine Quantitative with Creat Ratio  - HEMOGLOBIN A1C  - Lipid panel reflex to direct LDL Non-fasting    Psoriatic arthritis (H)  Per rheum    Morbid obesity (H)  Weight loss    Acute diarrhea  3 d- hyration. Seems mild     Adhesive capsulitis of right shoulder  Limited use due to pain/ROM. Xray then see ortho  - XR Shoulder Right G/E 3 Views; Future  - Orthopedic  Referral; Future    Hypertension  Well controlled  - CBC with Platelets  - BASIC METABOLIC PANEL    Encounter for long-term (current) use of medications  - Quantiferon-TB Gold Plus; Future  - Quantiferon-TB Gold Plus    Screen for colon cancer  cologuard    Visit for screening mammogram  - MA SCREENING DIGITAL BILAT - Future  (s+30); Future    Review of the result(s) of each unique test - labs  Ordering of each unique test  Prescription drug management            Nicotine/Tobacco Cessation:  She reports that she has been smoking cigarettes. She has a 50.00 pack-year smoking history. She has never used smokeless tobacco.  Nicotine/Tobacco Cessation Plan:   Information offered: Patient not interested at this time      BMI:   Estimated body mass index is 46.14 kg/m  as calculated from the following:    Height as of this encounter: 1.562 m (5' 1.5\").    Weight as of " "this encounter: 112.6 kg (248 lb 3.2 oz).   Weight management plan: Discussed healthy diet and exercise guidelines    See Patient Instructions    No follow-ups on file.    Virgil Goodson MD  [facility] Rohca is a 72 year old, presenting for the following health issues:  UTI      History of Present Illness       Reason for visit:  UTI    She eats 0-1 servings of fruits and vegetables daily.She consumes 0 sweetened beverage(s) daily.She exercises with enough effort to increase her heart rate 9 or less minutes per day.  She exercises with enough effort to increase her heart rate 3 or less days per week.   She is taking medications regularly.       Genitourinary - Female  Onset/Duration: 2 week  Description:   Painful urination (Dysuria): YES           Frequency: YES  Blood in urine (Hematuria): No  Delay in urine (Hesitency): YES  Intensity: moderate  Progression of Symptoms:  same  Accompanying Signs & Symptoms:  Fever/chills: YES  Flank pain: YES  Nausea and vomiting: No  Vaginal symptoms: none  Abdominal/Pelvic Pain: No  History:   History of frequent UTI s: No  History of kidney stones: No  Sexually Active: No  Possibility of pregnancy: No  Precipitating or alleviating factors: None  Therapies tried and outcome: abx and pyridium with some improvement and  increasing fluids, but sx returned         Review of Systems         Objective    /76   Pulse 111   Temp 97.4  F (36.3  C) (Temporal)   Ht 1.562 m (5' 1.5\")   Wt 112.6 kg (248 lb 3.2 oz)   LMP 04/11/2000   SpO2 94%   BMI 46.14 kg/m    Body mass index is 46.14 kg/m .  Physical Exam   GENERAL: alert, no distress and obese  RESP: lungs clear to auscultation - no rales, rhonchi or wheezes  CV: regular rate and rhythm, normal S1 S2, no S3 or S4, no murmur, click or rub, no peripheral edema and peripheral pulses strong  ABDOMEN: soft, nontender, no hepatosplenomegaly, no masses and bowel sounds " normal  MS: R shoulder- limited abduction over 90 d due to pain , passive ROM limited to same degree.,     Results for orders placed or performed in visit on 11/18/22   XR Shoulder Right G/E 3 Views     Status: None    Narrative    XR SHOULDER RIGHT G/E 3 VIEWS  11/18/2022 10:59 AM     HISTORY: frozen shoulder; Adhesive capsulitis of right shoulder  COMPARISON: 1/11/2018      Impression    IMPRESSION: No acute fracture or malalignment. Remote posttraumatic  deformity of the humeral head. Moderate glenohumeral and  acromioclavicular joint degenerative changes.     ELMA MEHTA MD         SYSTEM ID:  [id number]   Results for orders placed or performed in visit on 11/18/22   UA macro with reflex to Microscopic and Culture - Clinc Collect     Status: Abnormal    Specimen: Urine, Midstream   Result Value Ref Range    Color Urine Yellow Colorless, Straw, Light Yellow, Yellow    Appearance Urine Clear Clear    Glucose Urine Negative Negative mg/dL    Bilirubin Urine Negative Negative    Ketones Urine Negative Negative mg/dL    Specific Gravity Urine >=1.030 1.003 - 1.035    Blood Urine Large (A) Negative    pH Urine 5.5 5.0 - 7.0    Protein Albumin Urine 100 (A) Negative mg/dL    Urobilinogen Urine 0.2 0.2, 1.0 E.U./dL    Nitrite Urine Negative Negative    Leukocyte Esterase Urine Moderate (A) Negative   Albumin Random Urine Quantitative with Creat Ratio     Status: Abnormal   Result Value Ref Range    Albumin Urine mg/L 902.0 mg/L    Albumin Urine mg/g Cr 715.87 (H) 0.00 - 25.00 mg/g Cr    Creatinine Urine mg/dL 126.0 mg/dL   Urine Microscopic Exam     Status: Abnormal   Result Value Ref Range    Bacteria Urine Moderate (A) None Seen /HPF    RBC Urine 0-2 0-2 /HPF /HPF    WBC Urine >100 (A) 0-5 /HPF /HPF    Squamous Epithelials Urine Few (A) None Seen /LPF   CBC with Platelets     Status: Abnormal   Result Value Ref Range    WBC Count 9.4 4.0 - 11.0 10e3/uL    RBC Count 4.35 3.80 - 5.20 10e6/uL    Hemoglobin 13.2  11.7 - 15.7 g/dL    Hematocrit 42.5 35.0 - 47.0 %    MCV 98 78 - 100 fL    MCH 30.3 26.5 - 33.0 pg    MCHC 31.1 (L) 31.5 - 36.5 g/dL    RDW 15.3 (H) 10.0 - 15.0 %    Platelet Count 374 150 - 450 10e3/uL   HEMOGLOBIN A1C     Status: Abnormal   Result Value Ref Range    Hemoglobin A1C 6.7 (H) 0.0 - 5.6 %   BASIC METABOLIC PANEL     Status: Abnormal   Result Value Ref Range    Sodium 135 (L) 136 - 145 mmol/L    Potassium 4.6 3.4 - 5.3 mmol/L    Chloride 100 98 - 107 mmol/L    Carbon Dioxide (CO2) 16 (L) 22 - 29 mmol/L    Anion Gap 19 (H) 7 - 15 mmol/L    Urea Nitrogen 30.9 (H) 8.0 - 23.0 mg/dL    Creatinine 1.02 (H) 0.51 - 0.95 mg/dL    Calcium 9.8 8.8 - 10.2 mg/dL    Glucose 119 (H) 70 - 99 mg/dL    GFR Estimate 57 (L) >60 mL/min/1.73m2   Lipid panel reflex to direct LDL Non-fasting     Status: Abnormal   Result Value Ref Range    Cholesterol 99 <200 mg/dL    Triglycerides 106 <150 mg/dL    Direct Measure HDL 44 (L) >=50 mg/dL    LDL Cholesterol Calculated 34 <=100 mg/dL    Non HDL Cholesterol 55 <130 mg/dL    Narrative    Cholesterol  Desirable:  <200 mg/dL    Triglycerides  Normal:  Less than 150 mg/dL  Borderline High:  150-199 mg/dL  High:  200-499 mg/dL  Very High:  Greater than or equal to 500 mg/dL    Direct Measure HDL  Female:  Greater than or equal to 50 mg/dL   Male:  Greater than or equal to 40 mg/dL    LDL Cholesterol  Desirable:  <100mg/dL  Above Desirable:  100-129 mg/dL   Borderline High:  130-159 mg/dL   High:  160-189 mg/dL   Very High:  >= 190 mg/dL    Non HDL Cholesterol  Desirable:  130 mg/dL  Above Desirable:  130-159 mg/dL  Borderline High:  160-189 mg/dL  High:  190-219 mg/dL  Very High:  Greater than or equal to 220 mg/dL   Urine Culture     Status: None    Specimen: Urine, Midstream   Result Value Ref Range    Culture 50,000-100,000 CFU/mL Mixture of urogenital brad    Quantiferon TB Gold Plus Grey Tube     Status: None    Specimen: Hand, Right; Blood   Result Value Ref Range    Quantiferon  Nil Tube 0.03 IU/mL   Quantiferon TB Gold Plus Green Tube     Status: None    Specimen: Hand, Right; Blood   Result Value Ref Range    Quantiferon TB1 Tube 0.03 IU/mL   Quantiferon TB Gold Plus Yellow Tube     Status: None    Specimen: Hand, Right; Blood   Result Value Ref Range    Quantiferon TB2 Tube 0.05    Quantiferon TB Gold Plus Purple Tube     Status: None    Specimen: Hand, Right; Blood   Result Value Ref Range    Quantiferon Mitogen 4.96 IU/mL   Quantiferon TB Gold Plus     Status: None    Specimen: Hand, Right; Blood   Result Value Ref Range    Quantiferon-TB Gold Plus Negative Negative    TB1 Ag minus Nil Value 0.00 IU/mL    TB2 Ag minus Nil Value 0.02 IU/mL    Mitogen minus Nil Result 4.93 IU/mL    Nil Result 0.03 IU/mL   Quantiferon-TB Gold Plus     Status: None    Specimen: Hand, Right; Blood    Narrative    The following orders were created for panel order Quantiferon-TB Gold Plus.  Procedure                               Abnormality         Status                     ---------                               -----------         ------                     Quantiferon TB Gold Plus[[id number]]                         Final result               Quantiferon TB Gold Plus...[[id number]]                      Final result               Quantiferon TB Gold Plus...[[id number]]                      Final result               Quantiferon TB Gold Plus...[[id number]]                      Final result               Quantiferon TB Gold Plus...[[id number]]                      Final result                 Please view results for these tests on the individual orders.

## 2022-11-19 LAB
BACTERIA UR CULT: NORMAL
CHOLEST SERPL-MCNC: 99 MG/DL
HDLC SERPL-MCNC: 44 MG/DL
LDLC SERPL CALC-MCNC: 34 MG/DL
NONHDLC SERPL-MCNC: 55 MG/DL
QUANTIFERON MITOGEN: 4.96 IU/ML
QUANTIFERON NIL TUBE: 0.03 IU/ML
QUANTIFERON TB1 TUBE: 0.03 IU/ML
QUANTIFERON TB2 TUBE: 0.05
TRIGL SERPL-MCNC: 106 MG/DL

## 2022-11-20 LAB
GAMMA INTERFERON BACKGROUND BLD IA-ACNC: 0.03 IU/ML
M TB IFN-G BLD-IMP: NEGATIVE
M TB IFN-G CD4+ BCKGRND COR BLD-ACNC: 4.93 IU/ML
MITOGEN IGNF BCKGRD COR BLD-ACNC: 0 IU/ML
MITOGEN IGNF BCKGRD COR BLD-ACNC: 0.02 IU/ML

## 2022-11-25 ENCOUNTER — TELEPHONE (OUTPATIENT)
Dept: INTERNAL MEDICINE | Facility: CLINIC | Age: 72
End: 2022-11-25

## 2022-11-25 NOTE — TELEPHONE ENCOUNTER
"Pt called the clinic stating the abx did not work for the UTI. Pt finished the abx a couple days ago.   Symptoms have not gotten better but have not gotten worse. Constant pressure to urine and then can't urine when she gets into the bathroom. Pt can still let urine out \"eventually but at the worst place and the worst time\".     Pt is wondering if there is something else she can be prescribed for this?     Routing to PCP for recommendations.     Please call pt back with PCPs recommendations.     Can we leave a detailed message on this number? YES  Phone number patient can be reached at: Home number on file [phone] (home)    Yany Lewis RN  [facility] Triage    "

## 2022-11-27 PROBLEM — R80.9 TYPE 2 DIABETES MELLITUS WITH MICROALBUMINURIA, WITHOUT LONG-TERM CURRENT USE OF INSULIN (H): Status: ACTIVE | Noted: 2017-04-19

## 2022-11-27 PROBLEM — E11.29 TYPE 2 DIABETES MELLITUS WITH MICROALBUMINURIA, WITHOUT LONG-TERM CURRENT USE OF INSULIN (H): Status: ACTIVE | Noted: 2017-04-19

## 2022-12-03 ENCOUNTER — HEALTH MAINTENANCE LETTER (OUTPATIENT)
Age: 72
End: 2022-12-03

## 2022-12-05 NOTE — TELEPHONE ENCOUNTER
Called and spoke with pt, states she has received a call from Uro/gyn but did not make appt.  Relayed notes below to pt and pt stated she would call them to make appt    Lala Schulz CMA

## 2022-12-05 NOTE — TELEPHONE ENCOUNTER
I rec'd f/u with urogyn as ordered at last visit. Not sure if she calls or they call her. Look at referral and make sure she has approp. Guidance on this.

## 2022-12-12 ENCOUNTER — TELEPHONE (OUTPATIENT)
Dept: UROLOGY | Facility: CLINIC | Age: 72
End: 2022-12-12

## 2022-12-12 NOTE — TELEPHONE ENCOUNTER
[facility] Call Center    Phone Message    May a detailed message be left on voicemail: yes     Reason for Call: Appointment Intake    Referring Provider Name: Virgil Goodson MD in  INTERNAL MEDICINE  Diagnosis and/or Symptoms: Urinary retention with incomplete bladder emptying [R33.9]    Patient would like to be scheduled with URO/GYN at [place].  Please reach out to patient.    Action Taken: Other: Uro    Travel Screening: Not Applicable

## 2023-02-13 ENCOUNTER — MYC MEDICAL ADVICE (OUTPATIENT)
Dept: INTERNAL MEDICINE | Facility: CLINIC | Age: 73
End: 2023-02-13
Payer: COMMERCIAL

## 2023-02-27 ENCOUNTER — TRANSFERRED RECORDS (OUTPATIENT)
Dept: HEALTH INFORMATION MANAGEMENT | Facility: CLINIC | Age: 73
End: 2023-02-27

## 2023-02-27 LAB
ALT SERPL-CCNC: 37 IU/L (ref 5–34)
AST SERPL-CCNC: 33 U/L (ref 5–34)
CREATININE (EXTERNAL): 0.89 MG/DL (ref 0.5–1.3)

## 2023-02-28 ENCOUNTER — OFFICE VISIT (OUTPATIENT)
Dept: OPTOMETRY | Facility: CLINIC | Age: 73
End: 2023-02-28
Attending: INTERNAL MEDICINE
Payer: COMMERCIAL

## 2023-02-28 DIAGNOSIS — H04.129 DRY EYE: ICD-10-CM

## 2023-02-28 DIAGNOSIS — H52.203 HYPEROPIA OF BOTH EYES WITH ASTIGMATISM: ICD-10-CM

## 2023-02-28 DIAGNOSIS — H52.03 HYPEROPIA OF BOTH EYES WITH ASTIGMATISM: ICD-10-CM

## 2023-02-28 DIAGNOSIS — E11.9 TYPE 2 DIABETES MELLITUS WITHOUT COMPLICATION, WITHOUT LONG-TERM CURRENT USE OF INSULIN (H): ICD-10-CM

## 2023-02-28 DIAGNOSIS — Z96.1 PSEUDOPHAKIA OF BOTH EYES: ICD-10-CM

## 2023-02-28 DIAGNOSIS — E11.9 TYPE 2 DIABETES MELLITUS WITHOUT RETINOPATHY (H): Primary | ICD-10-CM

## 2023-02-28 PROCEDURE — 92015 DETERMINE REFRACTIVE STATE: CPT | Performed by: OPTOMETRIST

## 2023-02-28 PROCEDURE — 92004 COMPRE OPH EXAM NEW PT 1/>: CPT | Performed by: OPTOMETRIST

## 2023-02-28 ASSESSMENT — VISUAL ACUITY
OS_CC: 20/40
CORRECTION_TYPE: GLASSES
METHOD: SNELLEN - LINEAR
OD_CC: 20/30
OS_CC: 20/30
OD_CC: 20/40
OS_SC: 20/50
OD_SC: 20/60
OD_SC+: -2

## 2023-02-28 ASSESSMENT — REFRACTION_MANIFEST
OS_AXIS: 167
OS_SPHERE: +1.00
OD_ADD: +2.50
OD_AXIS: 170
OS_ADD: +2.50
OS_CYLINDER: +1.75
OD_CYLINDER: +0.75
OD_SPHERE: +1.25
OS_AXIS: 168
OD_SPHERE: -0.25
METHOD_AUTOREFRACTION: 1
OD_CYLINDER: +1.25
OD_AXIS: 172
OS_SPHERE: -1.50
OS_CYLINDER: +0.75

## 2023-02-28 ASSESSMENT — CONF VISUAL FIELD
OD_NORMAL: 1
OD_INFERIOR_NASAL_RESTRICTION: 0
OD_SUPERIOR_NASAL_RESTRICTION: 0
METHOD: COUNTING FINGERS
OS_INFERIOR_NASAL_RESTRICTION: 0
OS_INFERIOR_TEMPORAL_RESTRICTION: 0
OD_SUPERIOR_TEMPORAL_RESTRICTION: 0
OS_SUPERIOR_TEMPORAL_RESTRICTION: 0
OS_NORMAL: 1
OS_SUPERIOR_NASAL_RESTRICTION: 0
OD_INFERIOR_TEMPORAL_RESTRICTION: 0

## 2023-02-28 ASSESSMENT — KERATOMETRY
OS_AXISANGLE2_DEGREES: 176
OS_AXISANGLE_DEGREES: 86
OD_K2POWER_DIOPTERS: 43.62
OD_AXISANGLE_DEGREES: 7
OS_K2POWER_DIOPTERS: 44.87
OD_AXISANGLE2_DEGREES: 97
OS_K1POWER_DIOPTERS: 413.87
OD_K1POWER_DIOPTERS: 43.25

## 2023-02-28 ASSESSMENT — REFRACTION_WEARINGRX
OD_AXIS: 159
OS_CYLINDER: +1.00
OD_CYLINDER: +1.00
OS_ADD: +2.50
OD_SPHERE: +2.00
OD_ADD: +2.50
OS_SPHERE: +1.00
SPECS_TYPE: PAL
OS_AXIS: 039

## 2023-02-28 ASSESSMENT — CUP TO DISC RATIO
OD_RATIO: 0.3
OS_RATIO: 0.3

## 2023-02-28 ASSESSMENT — EXTERNAL EXAM - LEFT EYE: OS_EXAM: NORMAL

## 2023-02-28 ASSESSMENT — SLIT LAMP EXAM - LIDS
COMMENTS: THIN STRIP
COMMENTS: THIN STRIP

## 2023-02-28 ASSESSMENT — TONOMETRY
OD_IOP_MMHG: 16
OS_IOP_MMHG: 16
IOP_METHOD: APPLANATION

## 2023-02-28 ASSESSMENT — EXTERNAL EXAM - RIGHT EYE: OD_EXAM: NORMAL

## 2023-02-28 NOTE — LETTER
2/28/2023         RE: Josefina Maldonado  [address] [place]  [place] [address]        Dear Colleague,    Thank you for referring your patient, Josefina Maldonado, to the [facility]. Please see a copy of my visit note below.    Chief Complaint   Patient presents with     Diabetic Eye Exam     Accompanied by     Lab Results   Component Value Date    A1C 6.7 11/18/2022    A1C 6.1 07/26/2021    A1C 5.9 01/07/2020    A1C 5.8 04/15/2019    A1C 5.7 08/13/2018    A1C 5.6 02/06/2018    A1C 7.4 04/19/2017            Last Eye Exam: 10 years ago  Dilated Previously: Yes, side effects of dilation explained today    What are you currently using to see?  glasses    Distance Vision Acuity: Satisfied with vision    Near Vision Acuity: Satisfied with vision while reading and using computer with glasses    Eye Comfort: dry  Do you use eye drops? : No      Tika Kowalski - Optometric Assistant      Medical, surgical and family histories reviewed and updated 2/28/2023.     Diet and weight control    OBJECTIVE: See Ophthalmology exam    ASSESSMENT:    ICD-10-CM    1. Type 2 diabetes mellitus without retinopathy (H)  E11.9       2. Type 2 diabetes mellitus without complication, without long-term current use of insulin (H)  E11.9 Adult Eye  Referral      3. Hyperopia of both eyes with astigmatism  H52.03     H52.203       4. Pseudophakia of both eyes  Z96.1       5. Dry eye  H04.129         Subjective improvement  W/ MR    PLAN:  Glucose control to prevent retinopathy   Artificial tears as needed   Update prescription   Josefina Maldonado aware  eye exam results will be sent to Virgil Goodson OD           Again, thank you for allowing me to participate in the care of your patient.        Sincerely,        Aggie Sahu, OD

## 2023-02-28 NOTE — PROGRESS NOTES
Chief Complaint   Patient presents with     Diabetic Eye Exam     Accompanied by     Lab Results   Component Value Date    A1C 6.7 11/18/2022    A1C 6.1 07/26/2021    A1C 5.9 01/07/2020    A1C 5.8 04/15/2019    A1C 5.7 08/13/2018    A1C 5.6 02/06/2018    A1C 7.4 04/19/2017            Last Eye Exam: 10 years ago  Dilated Previously: Yes, side effects of dilation explained today    What are you currently using to see?  glasses    Distance Vision Acuity: Satisfied with vision    Near Vision Acuity: Satisfied with vision while reading and using computer with glasses    Eye Comfort: dry  Do you use eye drops? : No      Tika Kowalski - Optometric Assistant      Medical, surgical and family histories reviewed and updated 2/28/2023.     Diet and weight control    OBJECTIVE: See Ophthalmology exam    ASSESSMENT:    ICD-10-CM    1. Type 2 diabetes mellitus without retinopathy (H)  E11.9       2. Type 2 diabetes mellitus without complication, without long-term current use of insulin (H)  E11.9 Adult Eye  Referral      3. Hyperopia of both eyes with astigmatism  H52.03     H52.203       4. Pseudophakia of both eyes  Z96.1       5. Dry eye  H04.129         Subjective improvement  W/ MR    PLAN:  Glucose control to prevent retinopathy   Artificial tears as needed   Update prescription   Josefina Maldonado aware  eye exam results will be sent to Virgil Goodson OD

## 2023-02-28 NOTE — PATIENT INSTRUCTIONS
Patient Education   Diabetes weakens the blood vessels all over the body, including the eyes. Damage to the blood vessels in the eyes can cause swelling or bleeding into part of the eye (called the retina). This is called diabetic retinopathy (SIMI-tin--pu-thee). If not treated, this disease can cause vision loss or blindness.   Symptoms may include blurred or distorted vision, but many people have no symptoms. It's important to see your eye doctor regularly to check for problems.   Early treatment and good control can help protect your vision. Here are the things you can do to help prevent vision loss:      1. Keep your blood sugar levels under tight control.      2. Bring high blood pressure under control.      3. No smoking.      4. Have yearly dilated eye exams.   Updated prescription    DRY EYE TREATMENT    I recommend using artificial tears for your dry eye. There are over the counter drops that work well and may be used up to 4x daily. ( systane balance or ultra, blink, refresh optive, soothe xp) stay away from any red eye relief products such as visine and clear eyes.     If you need more than 4 drops daily, use a preservative free product which come in individual vials and may be used for 24 hours and discarded.   The more severe the dry eye, the more frequent instillation of artificial tears  that are needed to reduce irritation/ inflammation. (Sometimes every 1-2 hours for a couple of days).  You can also add a lubricating ointment in the lower lid at bedtime. ( over the counter refresh pm, genteal gel, lacrilube etc.)    Artificial tears work best as a preventative and not as well after your eyes are starting to bother you and/ or are red.  It may take 4- 6 weeks of using the drops before you notice improvement.  If after that time you are still having problems schedule an appointment for an evaluation to discuss different treatments.    Dry eyes are a chronic condition and you may have more symptoms  at certain times of the year.      Additional recommended treatment:  Warm compresses once to twice daily for 5-10 minutes    Directions for warm soaks  There are few methods for hot compresses. Moisten a washcloth with hot water, or microwave for 10 seconds, being careful to not get the cloth too hot.   Then put the washcloth onto your eyelids for 5 minutes. It will cool quickly so a rice pack or eyemask that can be heated and laid on top of the washcloth will help retain the heat.      Overabundance of bacterial microorganisms along the eyelashes and lid margins induce stress on the tear film and promote inflammation.  Regular lid hygiene helps diminish the bacterial population to prevent inflammation and infection.  Use a warm compress to loosen crusts   Cleanse lids once daily with a lid cleansing product as directed such as Ocusoft or Sterilid which can be purchased at most pharmacies. Diluted baby shampoo will also work, but not as well as it dries the skin and can irritate eyelids.  Hypo chlor spray may also be used on closed lids.      Omega 3 fatty acid supplements taken 1-2x daily  Recommend  at least  2000mg omega 3  800 EPA  600 DHA    Blink regularly  Stay hydrated  Increase humidity  Wear sunglasses   Avoid direct exposure to forced air--turn air vents in the car away and keep fans from blowing directly on your face

## 2023-03-21 ENCOUNTER — TRANSFERRED RECORDS (OUTPATIENT)
Dept: HEALTH INFORMATION MANAGEMENT | Facility: CLINIC | Age: 73
End: 2023-03-21

## 2023-06-01 ENCOUNTER — HEALTH MAINTENANCE LETTER (OUTPATIENT)
Age: 73
End: 2023-06-01

## 2023-08-30 ENCOUNTER — TRANSFERRED RECORDS (OUTPATIENT)
Dept: MULTI SPECIALTY CLINIC | Facility: CLINIC | Age: 73
End: 2023-08-30

## 2023-08-30 LAB — RETINOPATHY: NORMAL

## 2023-11-04 ENCOUNTER — HEALTH MAINTENANCE LETTER (OUTPATIENT)
Age: 73
End: 2023-11-04

## 2023-11-06 ENCOUNTER — MYC MEDICAL ADVICE (OUTPATIENT)
Dept: INTERNAL MEDICINE | Facility: CLINIC | Age: 73
End: 2023-11-06
Payer: COMMERCIAL

## 2023-11-06 DIAGNOSIS — R80.9 TYPE 2 DIABETES MELLITUS WITH MICROALBUMINURIA, WITHOUT LONG-TERM CURRENT USE OF INSULIN (H): Primary | ICD-10-CM

## 2023-11-06 DIAGNOSIS — E11.29 TYPE 2 DIABETES MELLITUS WITH MICROALBUMINURIA, WITHOUT LONG-TERM CURRENT USE OF INSULIN (H): Primary | ICD-10-CM

## 2023-11-06 NOTE — TELEPHONE ENCOUNTER
Dr. Goodson,    Please see pt MyChart message.   Medication pended for your review.     Thank you,  Marion Grayson RN

## 2024-01-07 DIAGNOSIS — I10 ESSENTIAL HYPERTENSION: ICD-10-CM

## 2024-01-07 DIAGNOSIS — E11.9 TYPE 2 DIABETES MELLITUS WITHOUT COMPLICATION, WITHOUT LONG-TERM CURRENT USE OF INSULIN (H): ICD-10-CM

## 2024-01-07 NOTE — LETTER
January 9, 2024    Josefina Maldonado  [address]  [place] [address]        Dear Josefina,    While refilling your prescription, we noticed that you are due to have a IN PERSON visit with your Provider.  We will refill your prescription for 30 days, but that appointment must be made before any additional refills can be approved.     Taking care of your health is important to us and we look forward to seeing you in the near future.  Please call us at [phone] or [phone] (or use [employer]) to schedule.  Please disregard this notice if you have already made an appointment.        Sincerely,           [facility] Team

## 2024-01-08 RX ORDER — LISINOPRIL AND HYDROCHLOROTHIAZIDE 12.5; 2 MG/1; MG/1
1 TABLET ORAL DAILY
Qty: 30 TABLET | Refills: 0 | Status: SHIPPED | OUTPATIENT
Start: 2024-01-08 | End: 2024-04-05

## 2024-01-08 RX ORDER — ATORVASTATIN CALCIUM 20 MG/1
20 TABLET, FILM COATED ORAL AT BEDTIME
Qty: 30 TABLET | Refills: 0 | Status: SHIPPED | OUTPATIENT
Start: 2024-01-08 | End: 2024-04-05

## 2024-01-13 ENCOUNTER — HEALTH MAINTENANCE LETTER (OUTPATIENT)
Age: 74
End: 2024-01-13

## 2024-01-31 NOTE — TELEPHONE ENCOUNTER
Sent Roberto Schulz, CMA     patient agrees to contact the PCP office for questions related to their healthcare.     Advance Care Planning:   reviewed and current.     Patients top risk factors for readmission: medical condition-Cellulitis left knee, S/P Total Knee Arthroplasty, left  Interventions to address risk factors: Education of patient/family/caregiver/guardian to support self-management-Patient instructed to continue to monitor for any returning swelling, redness, or warmth to touch, as well as monitoring for any of the other above noted s/s, reporting any that may present, to MD immediately.    Care Transitions Subsequent and Final Call    Schedule Follow Up Appointment with PCP: Declined  Subsequent and Final Calls  Do you have any ongoing symptoms?: No  Have your medications changed?: No  Do you have any questions related to your medications?: No  Do you currently have any active services?: Yes  Are you currently active with any services?: Home Health  Do you have any needs or concerns that I can assist you with?: No  Identified Barriers: None  Care Transitions Interventions  No Identified Needs  Other Interventions:           Care Transition Nurse provided contact information for future needs. No further follow-up call indicated based on severity of symptoms and risk factors.    Thank You,    Noemi Mike RN  Care Transition Coordinator  Contact Number:[phone]

## 2024-03-11 DIAGNOSIS — I10 HYPERTENSION: ICD-10-CM

## 2024-03-11 DIAGNOSIS — R80.9 TYPE 2 DIABETES MELLITUS WITH MICROALBUMINURIA, WITHOUT LONG-TERM CURRENT USE OF INSULIN (H): Primary | ICD-10-CM

## 2024-03-11 DIAGNOSIS — Z79.899 HIGH RISK MEDICATION USE: ICD-10-CM

## 2024-03-11 DIAGNOSIS — E11.29 TYPE 2 DIABETES MELLITUS WITH MICROALBUMINURIA, WITHOUT LONG-TERM CURRENT USE OF INSULIN (H): Primary | ICD-10-CM

## 2024-03-20 ENCOUNTER — LAB (OUTPATIENT)
Dept: LAB | Facility: CLINIC | Age: 74
End: 2024-03-20
Payer: COMMERCIAL

## 2024-03-20 DIAGNOSIS — E11.29 TYPE 2 DIABETES MELLITUS WITH MICROALBUMINURIA, WITHOUT LONG-TERM CURRENT USE OF INSULIN (H): ICD-10-CM

## 2024-03-20 DIAGNOSIS — I10 HYPERTENSION: ICD-10-CM

## 2024-03-20 DIAGNOSIS — Z79.899 HIGH RISK MEDICATION USE: ICD-10-CM

## 2024-03-20 DIAGNOSIS — R80.9 TYPE 2 DIABETES MELLITUS WITH MICROALBUMINURIA, WITHOUT LONG-TERM CURRENT USE OF INSULIN (H): ICD-10-CM

## 2024-03-20 LAB
ALBUMIN SERPL BCG-MCNC: 4.1 G/DL (ref 3.5–5.2)
ALP SERPL-CCNC: 77 U/L (ref 40–150)
ALT SERPL W P-5'-P-CCNC: 27 U/L (ref 0–50)
ANION GAP SERPL CALCULATED.3IONS-SCNC: 18 MMOL/L (ref 7–15)
AST SERPL W P-5'-P-CCNC: 24 U/L (ref 0–45)
BASOPHILS # BLD AUTO: 0.1 10E3/UL (ref 0–0.2)
BASOPHILS NFR BLD AUTO: 1 %
BILIRUB DIRECT SERPL-MCNC: <0.2 MG/DL (ref 0–0.3)
BILIRUB SERPL-MCNC: 0.3 MG/DL
BUN SERPL-MCNC: 24.5 MG/DL (ref 8–23)
CALCIUM SERPL-MCNC: 10.3 MG/DL (ref 8.8–10.2)
CHLORIDE SERPL-SCNC: 97 MMOL/L (ref 98–107)
CHOLEST SERPL-MCNC: 109 MG/DL
CREAT SERPL-MCNC: 0.92 MG/DL (ref 0.51–0.95)
CREAT UR-MCNC: 166 MG/DL
DEPRECATED HCO3 PLAS-SCNC: 23 MMOL/L (ref 22–29)
EGFRCR SERPLBLD CKD-EPI 2021: 65 ML/MIN/1.73M2
EOSINOPHIL # BLD AUTO: 0.4 10E3/UL (ref 0–0.7)
EOSINOPHIL NFR BLD AUTO: 4 %
ERYTHROCYTE [DISTWIDTH] IN BLOOD BY AUTOMATED COUNT: 14.7 % (ref 10–15)
FASTING STATUS PATIENT QL REPORTED: NO
GLUCOSE SERPL-MCNC: 145 MG/DL (ref 70–99)
HBA1C MFR BLD: 5.9 % (ref 0–5.6)
HCT VFR BLD AUTO: 42.4 % (ref 35–47)
HDLC SERPL-MCNC: 48 MG/DL
HGB BLD-MCNC: 13.7 G/DL (ref 11.7–15.7)
IMM GRANULOCYTES # BLD: 0 10E3/UL
IMM GRANULOCYTES NFR BLD: 0 %
LDLC SERPL CALC-MCNC: 42 MG/DL
LYMPHOCYTES # BLD AUTO: 2.5 10E3/UL (ref 0.8–5.3)
LYMPHOCYTES NFR BLD AUTO: 22 %
MCH RBC QN AUTO: 28.5 PG (ref 26.5–33)
MCHC RBC AUTO-ENTMCNC: 32.3 G/DL (ref 31.5–36.5)
MCV RBC AUTO: 88 FL (ref 78–100)
MICROALBUMIN UR-MCNC: 61.7 MG/L
MICROALBUMIN/CREAT UR: 37.17 MG/G CR (ref 0–25)
MONOCYTES # BLD AUTO: 0.9 10E3/UL (ref 0–1.3)
MONOCYTES NFR BLD AUTO: 8 %
NEUTROPHILS # BLD AUTO: 7.4 10E3/UL (ref 1.6–8.3)
NEUTROPHILS NFR BLD AUTO: 66 %
NONHDLC SERPL-MCNC: 61 MG/DL
PLATELET # BLD AUTO: 361 10E3/UL (ref 150–450)
POTASSIUM SERPL-SCNC: 4.2 MMOL/L (ref 3.4–5.3)
PROT SERPL-MCNC: 8.2 G/DL (ref 6.4–8.3)
RBC # BLD AUTO: 4.8 10E6/UL (ref 3.8–5.2)
SODIUM SERPL-SCNC: 138 MMOL/L (ref 135–145)
TRIGL SERPL-MCNC: 97 MG/DL
WBC # BLD AUTO: 11.3 10E3/UL (ref 4–11)

## 2024-03-20 PROCEDURE — 36415 COLL VENOUS BLD VENIPUNCTURE: CPT

## 2024-03-20 PROCEDURE — 82570 ASSAY OF URINE CREATININE: CPT

## 2024-03-20 PROCEDURE — 82043 UR ALBUMIN QUANTITATIVE: CPT

## 2024-03-20 PROCEDURE — 83036 HEMOGLOBIN GLYCOSYLATED A1C: CPT

## 2024-03-20 PROCEDURE — 80061 LIPID PANEL: CPT

## 2024-03-20 PROCEDURE — 80053 COMPREHEN METABOLIC PANEL: CPT

## 2024-03-20 PROCEDURE — 82248 BILIRUBIN DIRECT: CPT

## 2024-03-20 PROCEDURE — 85025 COMPLETE CBC W/AUTO DIFF WBC: CPT

## 2024-03-20 PROCEDURE — 86481 TB AG RESPONSE T-CELL SUSP: CPT

## 2024-03-21 LAB
QUANTIFERON MITOGEN: 3.77 IU/ML
QUANTIFERON NIL TUBE: 0.04 IU/ML
QUANTIFERON TB1 TUBE: 0.05 IU/ML
QUANTIFERON TB2 TUBE: 0.05

## 2024-03-22 LAB
GAMMA INTERFERON BACKGROUND BLD IA-ACNC: 0.04 IU/ML
M TB IFN-G BLD-IMP: NEGATIVE
M TB IFN-G CD4+ BCKGRND COR BLD-ACNC: 3.73 IU/ML
MITOGEN IGNF BCKGRD COR BLD-ACNC: 0.01 IU/ML
MITOGEN IGNF BCKGRD COR BLD-ACNC: 0.01 IU/ML

## 2024-04-04 DIAGNOSIS — E11.9 TYPE 2 DIABETES MELLITUS WITHOUT COMPLICATION, WITHOUT LONG-TERM CURRENT USE OF INSULIN (H): ICD-10-CM

## 2024-05-01 DIAGNOSIS — I10 ESSENTIAL HYPERTENSION: ICD-10-CM

## 2024-05-01 DIAGNOSIS — E11.9 TYPE 2 DIABETES MELLITUS WITHOUT COMPLICATION, WITHOUT LONG-TERM CURRENT USE OF INSULIN (H): ICD-10-CM

## 2024-05-02 RX ORDER — ATORVASTATIN CALCIUM 20 MG/1
20 TABLET, FILM COATED ORAL AT BEDTIME
Qty: 60 TABLET | Refills: 0 | Status: SHIPPED | OUTPATIENT
Start: 2024-05-02 | End: 2024-07-01

## 2024-05-02 RX ORDER — LISINOPRIL AND HYDROCHLOROTHIAZIDE 12.5; 2 MG/1; MG/1
1 TABLET ORAL DAILY
Qty: 60 TABLET | Refills: 0 | Status: SHIPPED | OUTPATIENT
Start: 2024-05-02 | End: 2024-07-01

## 2024-06-20 ENCOUNTER — OFFICE VISIT (OUTPATIENT)
Dept: INTERNAL MEDICINE | Facility: CLINIC | Age: 74
End: 2024-06-20
Attending: INTERNAL MEDICINE
Payer: COMMERCIAL

## 2024-06-20 VITALS
DIASTOLIC BLOOD PRESSURE: 76 MMHG | BODY MASS INDEX: 41.2 KG/M2 | HEIGHT: 62 IN | WEIGHT: 223.9 LBS | RESPIRATION RATE: 16 BRPM | HEART RATE: 103 BPM | OXYGEN SATURATION: 96 % | SYSTOLIC BLOOD PRESSURE: 122 MMHG | TEMPERATURE: 97.6 F

## 2024-06-20 DIAGNOSIS — M75.01 ADHESIVE CAPSULITIS OF RIGHT SHOULDER: ICD-10-CM

## 2024-06-20 DIAGNOSIS — R80.9 TYPE 2 DIABETES MELLITUS WITH MICROALBUMINURIA, WITHOUT LONG-TERM CURRENT USE OF INSULIN (H): ICD-10-CM

## 2024-06-20 DIAGNOSIS — F32.2 CURRENT SEVERE EPISODE OF MAJOR DEPRESSIVE DISORDER WITHOUT PSYCHOTIC FEATURES WITHOUT PRIOR EPISODE (H): ICD-10-CM

## 2024-06-20 DIAGNOSIS — L40.50 PSORIATIC ARTHRITIS (H): ICD-10-CM

## 2024-06-20 DIAGNOSIS — E11.9 TYPE 2 DIABETES MELLITUS WITHOUT COMPLICATION, WITHOUT LONG-TERM CURRENT USE OF INSULIN (H): ICD-10-CM

## 2024-06-20 DIAGNOSIS — E66.01 MORBID OBESITY (H): ICD-10-CM

## 2024-06-20 DIAGNOSIS — Z00.00 ENCOUNTER FOR MEDICARE ANNUAL WELLNESS EXAM: Primary | ICD-10-CM

## 2024-06-20 DIAGNOSIS — E11.29 TYPE 2 DIABETES MELLITUS WITH MICROALBUMINURIA, WITHOUT LONG-TERM CURRENT USE OF INSULIN (H): ICD-10-CM

## 2024-06-20 DIAGNOSIS — R26.81 GAIT INSTABILITY: ICD-10-CM

## 2024-06-20 PROCEDURE — 99214 OFFICE O/P EST MOD 30 MIN: CPT | Mod: 25 | Performed by: INTERNAL MEDICINE

## 2024-06-20 PROCEDURE — G0439 PPPS, SUBSEQ VISIT: HCPCS | Performed by: INTERNAL MEDICINE

## 2024-06-20 RX ORDER — DULOXETIN HYDROCHLORIDE 30 MG/1
CAPSULE, DELAYED RELEASE ORAL
Qty: 60 CAPSULE | Refills: 5 | Status: SHIPPED | OUTPATIENT
Start: 2024-06-20 | End: 2027-03-30

## 2024-06-20 SDOH — HEALTH STABILITY: PHYSICAL HEALTH: ON AVERAGE, HOW MANY DAYS PER WEEK DO YOU ENGAGE IN MODERATE TO STRENUOUS EXERCISE (LIKE A BRISK WALK)?: 0 DAYS

## 2024-06-20 ASSESSMENT — PATIENT HEALTH QUESTIONNAIRE - PHQ9
SUM OF ALL RESPONSES TO PHQ QUESTIONS 1-9: 14
SUM OF ALL RESPONSES TO PHQ QUESTIONS 1-9: 14
10. IF YOU CHECKED OFF ANY PROBLEMS, HOW DIFFICULT HAVE THESE PROBLEMS MADE IT FOR YOU TO DO YOUR WORK, TAKE CARE OF THINGS AT HOME, OR GET ALONG WITH OTHER PEOPLE: SOMEWHAT DIFFICULT

## 2024-06-20 ASSESSMENT — COLUMBIA-SUICIDE SEVERITY RATING SCALE - C-SSRS
2. IN THE PAST MONTH, HAVE YOU ACTUALLY HAD ANY THOUGHTS OF KILLING YOURSELF?: NO
6. HAVE YOU EVER DONE ANYTHING, STARTED TO DO ANYTHING, OR PREPARED TO DO ANYTHING TO END YOUR LIFE?: NO
1. WITHIN THE PAST MONTH, HAVE YOU WISHED YOU WERE DEAD OR WISHED YOU COULD GO TO SLEEP AND NOT WAKE UP?: YES

## 2024-06-20 ASSESSMENT — SOCIAL DETERMINANTS OF HEALTH (SDOH): HOW OFTEN DO YOU GET TOGETHER WITH FRIENDS OR RELATIVES?: PATIENT DECLINED

## 2024-06-20 NOTE — PATIENT INSTRUCTIONS
"Patient Education   Preventive Care Advice   This is general advice we often give to help people stay healthy. Your care team may have specific advice just for you. Please talk to your care team about your own preventive care needs.  Lifestyle  Exercise at least 150 minutes each week (30 minutes a day, 5 days a week).  Do muscle strengthening activities 2 days a week. These help control your weight and prevent disease.  No smoking.  Wear sunscreen to prevent skin cancer.  Have your home tested for radon every 2 to 5 years. Radon is a colorless, odorless gas that can harm your lungs. To learn more, go to [email or web address].[email or web address].us and search for \"Radon in Homes.\"  Keep guns unloaded and locked up in a safe place like a safe or gun vault, or, use a gun lock and hide the keys. Always lock away bullets separately. To learn more, visit .[place].gov and search for \"safe gun storage.\"  Nutrition  Eat 5 or more servings of fruits and vegetables each day.  Try wheat bread, brown rice and whole grain pasta (instead of white bread, rice, and pasta).  Get enough calcium and vitamin D. Check the label on foods and aim for 100% of the RDA (recommended daily allowance).  Regular exams  Have a dental exam and cleaning every 6 months.  See your health care team every year to talk about:  Any changes in your health.  Any medicines your care team has prescribed.  Preventive care, family planning, and ways to prevent chronic diseases.  Shots (vaccines)   HPV shots (up to age 26), if you've never had them before.  Hepatitis B shots (up to age 59), if you've never had them before.  COVID-19 shot: Get this shot when it's due.  Flu shot: Get a flu shot every year.  Tetanus shot: Get a tetanus shot every 10 years.  Pneumococcal, hepatitis A, and RSV shots: Ask your care team if you need these based on your risk.  Shingles shot (for age 50 and up).  General health tests  Diabetes screening:  Starting at age 35, Get screened for diabetes at least " every 3 years.  If you are younger than age 35, ask your care team if you should be screened for diabetes.  Cholesterol test: At age 39, start having a cholesterol test every 5 years, or more often if advised.  Bone density scan (DEXA): At age 50, ask your care team if you should have this scan for osteoporosis (brittle bones).  Hepatitis C: Get tested at least once in your life.  Abdominal aortic aneurysm screening: Talk to your doctor about having this screening if you:  Have ever smoked; and  Are biologically male; and  Are between the ages of 65 and 75.  STIs (sexually transmitted infections)  Before age 24: Ask your care team if you should be screened for STIs.  After age 24: Get screened for STIs if you're at risk. You are at risk for STIs (including HIV) if:  You are sexually active with more than one person.  You don't use condoms every time.  You or a partner was diagnosed with a sexually transmitted infection.  If you are at risk for HIV, ask about PrEP medicine to prevent HIV.  Get tested for HIV at least once in your life, whether you are at risk for HIV or not.  Cancer screening tests  Cervical cancer screening: If you have a cervix, begin getting regular cervical cancer screening tests at age 21. Most people who have regular screenings with normal results can stop after age 65. Talk about this with your provider.  Breast cancer scan (mammogram): If you've ever had breasts, begin having regular mammograms starting at age 40. This is a scan to check for breast cancer.  Colon cancer screening: It is important to start screening for colon cancer at age 45.  Have a colonoscopy test every 10 years (or more often if you're at risk) Or, ask your provider about stool tests like a FIT test every year or Cologuard test every 3 years.  To learn more about your testing options, visit: [email or web address]/371996.pdf.  For help making a decision, visit: troy/pe59489.  Prostate cancer screening test: If you have a  prostate and are age 55 to 69, ask your provider if you would benefit from a yearly prostate cancer screening test.  Lung cancer screening: If you are a current or former smoker age 50 to 80, ask your care team if ongoing lung cancer screenings are right for you.  For informational purposes only. Not to replace the advice of your health care provider. Copyright   2023 [facility]. All rights reserved. Clinically reviewed by the  [employer] [place] [employer] [employer]. MD.Voice [id number] - REV 04/24.  Preventing Falls: Care Instructions  Injuries and health problems such as trouble walking or poor eyesight can increase your risk of falling. So can some medicines. But there are things you can do to help prevent falls. You can exercise to get stronger. You can also arrange your home to make it safer.    Talk to your doctor about the medicines you take. Ask if any of them increase the risk of falls and whether they can be changed or stopped.   Try to exercise regularly. It can help improve your strength and balance. This can help lower your risk of falling.     Practice fall safety and prevention.    Wear low-heeled shoes that fit well and give your feet good support. Talk to your doctor if you have foot problems that make this hard.  Carry a cellphone or wear a medical alert device that you can use to call for help.  Use stepladders instead of chairs to reach high objects. Don't climb if you're at risk for falls. Ask for help, if needed.  Wear the correct eyeglasses, if you need them.    Make your home safer.    Remove rugs, cords, clutter, and furniture from walkways.  Keep your house well lit. Use night-lights in hallways and bathrooms.  Install and use sturdy handrails on stairways.  Wear nonskid footwear, even inside. Don't walk barefoot or in socks without shoes.    Be safe outside.    Use handrails, curb cuts, and ramps whenever possible.  Keep your hands free by using a shoulder bag or backpack.  Try  "to walk in well-lit areas. Watch out for uneven ground, changes in pavement, and debris.  Be careful in the winter. Walk on the grass or gravel when sidewalks are slippery. Use de-icer on steps and walkways. Add non-slip devices to shoes.    Put grab bars and nonskid mats in your shower or tub and near the toilet. Try to use a shower chair or bath bench when bathing.   Get into a tub or shower by putting in your weaker leg first. Get out with your strong side first. Have a phone or medical alert device in the bathroom with you.   Where can you learn more?  Go to https://www.[employer].net/patiented  Enter G117 in the search box to learn more about \"Preventing Falls: Care Instructions.\"  Current as of: July 17, 2023               Content Version: 14.0    7102-9278 [employer].   Care instructions adapted under license by your healthcare professional. If you have questions about a medical condition or this instruction, always ask your healthcare professional. [employer] disclaims any warranty or liability for your use of this information.      Learning About Stress  What is stress?     Stress is your body's response to a hard situation. Your body can have a physical, emotional, or mental response. Stress is a fact of life for most people, and it affects everyone differently. What causes stress for you may not be stressful for someone else.  A lot of things can cause stress. You may feel stress when you go on a job interview, take a test, or run a race. This kind of short-term stress is normal and even useful. It can help you if you need to work hard or react quickly. For example, stress can help you finish an important job on time.  Long-term stress is caused by ongoing stressful situations or events. Examples of long-term stress include long-term health problems, ongoing problems at work, or conflicts in your family. Long-term stress can harm your health.  How does stress affect your " health?  When you are stressed, your body responds as though you are in danger. It makes hormones that speed up your heart, make you breathe faster, and give you a burst of energy. This is called the fight-or-flight stress response. If the stress is over quickly, your body goes back to normal and no harm is done.  But if stress happens too often or lasts too long, it can have bad effects. Long-term stress can make you more likely to get sick, and it can make symptoms of some diseases worse. If you tense up when you are stressed, you may develop neck, shoulder, or low back pain. Stress is linked to high blood pressure and heart disease.  Stress also harms your emotional health. It can make you wisdom, tense, or depressed. Your relationships may suffer, and you may not do well at work or school.  What can you do to manage stress?  You can try these things to help manage stress:   Do something active. Exercise or activity can help reduce stress. Walking is a great way to get started. Even everyday activities such as housecleaning or yard work can help.  Try yoga or eli chi. These techniques combine exercise and meditation. You may need some training at first to learn them.  Do something you enjoy. For example, listen to music or go to a movie. Practice your hobby or do volunteer work.  Meditate. This can help you relax, because you are not worrying about what happened before or what may happen in the future.  Do guided imagery. Imagine yourself in any setting that helps you feel calm. You can use online videos, books, or a teacher to guide you.  Do breathing exercises. For example:  From a standing position, bend forward from the waist with your knees slightly bent. Let your arms dangle close to the floor.  Breathe in slowly and deeply as you return to a standing position. Roll up slowly and lift your head last.  Hold your breath for just a few seconds in the standing position.  Breathe out slowly and bend forward from  "the waist.  Let your feelings out. Talk, laugh, cry, and express anger when you need to. Talking with supportive friends or family, a counselor, or a guillermo leader about your feelings is a healthy way to relieve stress. Avoid discussing your feelings with people who make you feel worse.  Write. It may help to write about things that are bothering you. This helps you find out how much stress you feel and what is causing it. When you know this, you can find better ways to cope.  What can you do to prevent stress?  You might try some of these things to help prevent stress:  Manage your time. This helps you find time to do the things you want and need to do.  Get enough sleep. Your body recovers from the stresses of the day while you are sleeping.  Get support. Your family, friends, and community can make a difference in how you experience stress.  Limit your news feed. Avoid or limit time on social media or news that may make you feel stressed.  Do something active. Exercise or activity can help reduce stress. Walking is a great way to get started.  Where can you learn more?  Go to https://www.[employer].net/patiented  Enter N032 in the search box to learn more about \"Learning About Stress.\"  Current as of: October 24, 2023               Content Version: 14.0    1284-4790 [employer].   Care instructions adapted under license by your healthcare professional. If you have questions about a medical condition or this instruction, always ask your healthcare professional. [employer] disclaims any warranty or liability for your use of this information.      Bladder Training: Care Instructions  Your Care Instructions     Bladder training is used to treat urge incontinence and stress incontinence. Urge incontinence means that the need to urinate comes on so fast that you can't get to a toilet in time. Stress incontinence means that you leak urine because of pressure on your bladder. For example, it may " happen when you laugh, cough, or lift something heavy.  Bladder training can increase how long you can wait before you have to urinate. It can also help your bladder hold more urine. And it can give you better control over the urge to urinate.  It is important to remember that bladder training takes a few weeks to a few months to make a difference. You may not see results right away, but don't give up.  Follow-up care is a key part of your treatment and safety. Be sure to make and go to all appointments, and call your doctor if you are having problems. It's also a good idea to know your test results and keep a list of the medicines you take.  How can you care for yourself at home?  Work with your doctor to come up with a bladder training program that is right for you. You may use one or more of the following methods.  Delayed urination  In the beginning, try to keep from urinating for 5 minutes after you first feel the need to go.  While you wait, take deep, slow breaths to relax. Kegel exercises can also help you delay the need to go to the bathroom.  After some practice, when you can easily wait 5 minutes to urinate, try to wait 10 minutes before you urinate.  Slowly increase the waiting period until you are able to control when you have to urinate.  Scheduled urination  Empty your bladder when you first wake up in the morning.  Schedule times throughout the day when you will urinate.  Start by going to the bathroom every hour, even if you don't need to go.  Slowly increase the time between trips to the bathroom.  When you have found a schedule that works well for you, keep doing it.  If you wake up during the night and have to urinate, do it. Apply your schedule to waking hours only.  Kegel exercises  These tighten and strengthen pelvic muscles, which can help you control the flow of urine. (If doing these exercises causes pain, stop doing them and talk with your doctor.) To do Kegel exercises:  Squeeze your  "muscles as if you were trying not to pass gas. Or squeeze your muscles as if you were stopping the flow of urine. Your belly, legs, and buttocks shouldn't move.  Hold the squeeze for 3 seconds, then relax for 5 to 10 seconds.  Start with 3 seconds, then add 1 second each week until you are able to squeeze for 10 seconds.  Repeat the exercise 10 times a session. Do 3 to 8 sessions a day.  When should you call for help?  Watch closely for changes in your health, and be sure to contact your doctor if:    Your incontinence is getting worse.     You do not get better as expected.   Where can you learn more?  Go to https://www.[employer].net/patiented  Enter V684 in the search box to learn more about \"Bladder Training: Care Instructions.\"  Current as of: November 15, 2023               Content Version: 14.0    2504-2907 [employer].   Care instructions adapted under license by your healthcare professional. If you have questions about a medical condition or this instruction, always ask your healthcare professional. [employer] disclaims any warranty or liability for your use of this information.      Learning About Depression Screening  What is depression screening?  Depression screening is a way to see if you have depression symptoms. It may be done by a doctor or counselor. It's often part of a routine checkup. That's because your mental health is just as important as your physical health.  Depression is a mental health condition that affects how you feel, think, and act. You may:  Have less energy.  Lose interest in your daily activities.  Feel sad and grouchy for a long time.  Depression is very common. It affects people of all ages.  Many things can lead to depression. Some people become depressed after they have a stroke or find out they have a major illness like cancer or heart disease. The death of a loved one or a breakup may lead to depression. It can run in families. Most experts " "believe that a combination of inherited genes and stressful life events can cause it.  What happens during screening?  You may be asked to fill out a form about your depression symptoms. You and the doctor will discuss your answers. The doctor may ask you more questions to learn more about how you think, act, and feel.  What happens after screening?  If you have symptoms of depression, your doctor will talk to you about your options.  Doctors usually treat depression with medicines or counseling. Often, combining the two works best. Many people don't get help because they think that they'll get over the depression on their own. But people with depression may not get better unless they get treatment.  The cause of depression is not well understood. There may be many factors involved. But if you have depression, it's not your fault.  A serious symptom of depression is thinking about death or suicide. If you or someone you care about talks about this or about feeling hopeless, get help right away.  It's important to know that depression can be treated. Medicine, counseling, and self-care may help.  Where can you learn more?  Go to https://www.[employer].net/patiented  Enter T185 in the search box to learn more about \"Learning About Depression Screening.\"  Current as of: June 24, 2023               Content Version: 14.0    7852-1830 [employer].   Care instructions adapted under license by your healthcare professional. If you have questions about a medical condition or this instruction, always ask your healthcare professional. [employer] disclaims any warranty or liability for your use of this information.         "

## 2024-06-20 NOTE — PROGRESS NOTES
"Preventive Care Visit  [facility]  Virgil Goodson MD, Internal Medicine  Jun 20, 2024      Assessment & Plan     Encounter for Medicare annual wellness exam  Updated screening, immunizations, prevention.  Please see health maintenance list, care gaps     Type 2 diabetes mellitus with microalbuminuria, without long-term current use of insulin (H)  Excellent control  Focus on weight loss  - Adult Eye  Referral; Future    Current severe episode of major depressive disorder without psychotic features without prior episode (H)  Recommend trying something for mental health and pain. She is quite labile in mood and recommend seeing mental health specialist as well as a pain specialist  - DULoxetine (CYMBALTA) 30 MG capsule; Take 1 capsule (30 mg) by mouth daily for 14 days, THEN 1 capsule (30 mg) 2 times daily.    Psoriatic arthritis (H)  Per rheumatology    Morbid obesity (H)  Weight loss     Gait instability  - Physical Therapy  Referral; Future    Adhesive capsulitis of right shoulder  Not interested in seeing orthopedics , willing to see PT     Patient has been advised of split billing requirements and indicates understanding: Yes        Nicotine/Tobacco Cessation  She reports that she has been smoking cigarettes. She has a 50 pack-year smoking history. She has never used smokeless tobacco.  Nicotine/Tobacco Cessation Plan  Information offered: Patient not interested at this time      BMI  Estimated body mass index is 41.62 kg/m  as calculated from the following:    Height as of this encounter: 1.562 m (5' 1.5\").    Weight as of this encounter: 101.6 kg (223 lb 14.4 oz).   Weight management plan: Discussed healthy diet and exercise guidelines    Counseling  Appropriate preventive services were discussed with this patient, including applicable screening as appropriate for fall prevention, nutrition, physical activity, Tobacco-use cessation, weight loss and cognition.  " Checklist reviewing preventive services available has been given to the patient.  The patient's PHQ-9 score is consistent with moderate depression. She was provided with information regarding depression.       Work on weight loss  Regular exercise  See Patient Instructions    Jackeline Rocha is a 73 year old, presenting for the following:  Medicare Visit        Via the Health Maintenance questionnaire, the patient has reported the following services have been completed , this information has been sent to the abstraction team.  Health Care Directive  Patient does not have a Health Care Directive or Living Will: Discussed advance care planning with patient; however, patient declined at this time.    HPI  C/o lots of pain in shoulder R.             6/20/2024   General Health   How would you rate your overall physical health? (!) FAIR   Feel stress (tense, anxious, or unable to sleep) Rather much      (!) STRESS CONCERN      6/20/2024   Nutrition   Diet: Carbohydrate counting            6/20/2024   Exercise   Days per week of moderate/strenous exercise 0 days      (!) EXERCISE CONCERN      6/20/2024   Social Factors   Frequency of gathering with friends or relatives Patient declined   Worry food won't last until get money to buy more No   Food not last or not have enough money for food? No   Do you have housing? (Housing is defined as stable permanent housing and does not include staying ouside in a car, in a tent, in an abandoned building, in an overnight shelter, or couch-surfing.) Yes   Are you worried about losing your housing? No   Lack of transportation? No   Unable to get utilities (heat,electricity)? No            6/20/2024   Fall Risk   Fallen 2 or more times in the past year? Yes    Yes   Trouble with walking or balance? Yes    Yes   Gait Speed Test (Document in seconds) 5.37   Gait Speed Test Interpretation Greater than 5.01 seconds - ABNORMAL       Multiple values from one day are sorted in  reverse-chronological order          6/20/2024   Activities of Daily Living- Home Safety   Needs help with the following daily activites None of the above   Safety concerns in the home None of the above            6/20/2024   Dental   Dentist two times every year? (!) NO            6/20/2024   Hearing Screening   Hearing concerns? None of the above            6/20/2024   Driving Risk Screening   Patient/family members have concerns about driving No            6/20/2024   General Alertness/Fatigue Screening   Have you been more tired than usual lately? No            6/20/2024   Urinary Incontinence Screening   Bothered by leaking urine in past 6 months Yes             Today's PHQ-9 Score:       6/20/2024    12:56 PM   PHQ-9 SCORE   PHQ-9 Total Score MyChart 14 (Moderate depression)   PHQ-9 Total Score 14         6/20/2024   Substance Use   Alcohol more than 3/day or more than 7/wk Not Applicable   Do you have a current opioid prescription? No   How severe/bad is pain from 1 to 10? 8/10   Do you use any other substances recreationally? No        Social History     Tobacco Use    Smoking status: Every Day     Current packs/day: 1.00     Average packs/day: 1 pack/day for 50.0 years (50.0 ttl pk-yrs)     Types: Cigarettes    Smokeless tobacco: Never    Tobacco comments:     vaping   Substance Use Topics    Alcohol use: No    Drug use: No          Mammogram Screening - After age 74- determine frequency with patient based on health status, life expectancy and patient goals    ASCVD Risk   The ASCVD Risk score ([place] DK, et al., 2019) failed to calculate for the following reasons:    The valid total cholesterol range is 130 to 320 mg/dL            Reviewed and updated as needed this visit by Provider   Tobacco                  Lab work is in process  Labs reviewed in EPIC  Current providers sharing in care for this patient include:  Patient Care Team:  Virgil Goodson MD as PCP - General (Internal  "Medicine)  Virgil Goodson MD as Assigned PCP  Aggie Sahu OD as MD (Ophthalmology)  Virgil Goodson MD as Referring Physician (Internal Medicine)  Isaak Cervantes MD as MD (OB/Gyn)  Aggie Sahu OD as Assigned Surgical Provider    The following health maintenance items are reviewed in Epic and correct as of today:  Health Maintenance   Topic Date Due    DEPRESSION ACTION PLAN  Never done    ZOSTER IMMUNIZATION (1 of 2) Never done    RSV VACCINE (Pregnancy & 60+) (1 - 1-dose 60+ series) Never done    LUNG CANCER SCREENING  06/27/2012    DTAP/TDAP/TD IMMUNIZATION (2 - Td or Tdap) 06/24/2018    DIABETIC FOOT EXAM  04/15/2020    COLORECTAL CANCER SCREENING  06/29/2021    COVID-19 Vaccine (4 - 2023-24 season) 09/01/2023    NICOTINE/TOBACCO CESSATION COUNSELING Q 1 YR  11/18/2023    EYE EXAM  08/30/2024    INFLUENZA VACCINE (1) 09/01/2024    A1C  09/20/2024    ANNUAL REVIEW OF HM ORDERS  11/06/2024    PHQ-9  12/20/2024    BMP  03/20/2025    LIPID  03/20/2025    MICROALBUMIN  03/20/2025    MEDICARE ANNUAL WELLNESS VISIT  06/20/2025    FALL RISK ASSESSMENT  06/20/2025    DEXA  07/16/2027    ADVANCE CARE PLANNING  06/20/2029    HEPATITIS C SCREENING  Completed    Pneumococcal Vaccine: 65+ Years  Completed    IPV IMMUNIZATION  Aged Out    HPV IMMUNIZATION  Aged Out    MENINGITIS IMMUNIZATION  Aged Out    RSV MONOCLONAL ANTIBODY  Aged Out    MAMMO SCREENING  Discontinued            Objective    Exam  /76   Pulse 103   Temp 97.6  F (36.4  C) (Temporal)   Resp 16   Ht 1.562 m (5' 1.5\")   Wt 101.6 kg (223 lb 14.4 oz)   LMP 04/11/2000   SpO2 96%   BMI 41.62 kg/m     Estimated body mass index is 41.62 kg/m  as calculated from the following:    Height as of this encounter: 1.562 m (5' 1.5\").    Weight as of this encounter: 101.6 kg (223 lb 14.4 oz).  Physical Exam  GENERAL: obese  HENT: normal cephalic/atraumatic  NECK: no adenopathy  RESP: lungs clear to auscultation - no rales, " rhonchi or wheezes  CV: regular rate and rhythm, normal S1 S2, no S3 or S4, no murmur, click or rub, no peripheral edema   ABDOMEN: soft, nontender, no hepatosplenomegaly, no masses and bowel sounds normal  MS: limited pain free ROM palmira in R shoulder , limited abduction   SKIN: no suspicious lesions or rashes        6/20/2024   Mini Cog   Clock Draw Score 2 Normal   3 Item Recall 3 objects recalled   Mini Cog Total Score 5            No results found for any visits on 06/20/24.        Signed Electronically by: Virgil Goodson MD

## 2024-07-01 DIAGNOSIS — I10 ESSENTIAL HYPERTENSION: ICD-10-CM

## 2024-07-01 DIAGNOSIS — E11.9 TYPE 2 DIABETES MELLITUS WITHOUT COMPLICATION, WITHOUT LONG-TERM CURRENT USE OF INSULIN (H): ICD-10-CM

## 2024-07-01 RX ORDER — LISINOPRIL AND HYDROCHLOROTHIAZIDE 12.5; 2 MG/1; MG/1
1 TABLET ORAL DAILY
Qty: 90 TABLET | Refills: 2 | Status: SHIPPED | OUTPATIENT
Start: 2024-07-01

## 2024-07-01 RX ORDER — ATORVASTATIN CALCIUM 20 MG/1
20 TABLET, FILM COATED ORAL AT BEDTIME
Qty: 90 TABLET | Refills: 2 | Status: SHIPPED | OUTPATIENT
Start: 2024-07-01

## 2024-07-25 ENCOUNTER — TRANSFERRED RECORDS (OUTPATIENT)
Dept: HEALTH INFORMATION MANAGEMENT | Facility: CLINIC | Age: 74
End: 2024-07-25
Payer: COMMERCIAL

## 2024-09-21 ENCOUNTER — LAB (OUTPATIENT)
Dept: LAB | Facility: CLINIC | Age: 74
End: 2024-09-21
Payer: COMMERCIAL

## 2024-09-21 DIAGNOSIS — N30.00 ACUTE CYSTITIS WITHOUT HEMATURIA: Primary | ICD-10-CM

## 2024-09-21 DIAGNOSIS — R35.0 URINE FREQUENCY: ICD-10-CM

## 2024-09-21 DIAGNOSIS — E11.29 TYPE 2 DIABETES MELLITUS WITH MICROALBUMINURIA, WITHOUT LONG-TERM CURRENT USE OF INSULIN (H): Primary | ICD-10-CM

## 2024-09-21 DIAGNOSIS — R80.9 TYPE 2 DIABETES MELLITUS WITH MICROALBUMINURIA, WITHOUT LONG-TERM CURRENT USE OF INSULIN (H): Primary | ICD-10-CM

## 2024-09-21 LAB
ALBUMIN UR-MCNC: 100 MG/DL
APPEARANCE UR: CLEAR
BACTERIA #/AREA URNS HPF: ABNORMAL /HPF
BILIRUB UR QL STRIP: NEGATIVE
COLOR UR AUTO: YELLOW
EST. AVERAGE GLUCOSE BLD GHB EST-MCNC: 123 MG/DL
GLUCOSE UR STRIP-MCNC: NEGATIVE MG/DL
HBA1C MFR BLD: 5.9 % (ref 0–5.6)
HGB UR QL STRIP: ABNORMAL
KETONES UR STRIP-MCNC: NEGATIVE MG/DL
LEUKOCYTE ESTERASE UR QL STRIP: ABNORMAL
NITRATE UR QL: NEGATIVE
PH UR STRIP: 6 [PH] (ref 5–7)
RBC #/AREA URNS AUTO: ABNORMAL /HPF
SP GR UR STRIP: >=1.03 (ref 1–1.03)
SQUAMOUS #/AREA URNS AUTO: ABNORMAL /LPF
UROBILINOGEN UR STRIP-ACNC: 0.2 E.U./DL
WBC #/AREA URNS AUTO: ABNORMAL /HPF

## 2024-09-21 PROCEDURE — 36415 COLL VENOUS BLD VENIPUNCTURE: CPT

## 2024-09-21 PROCEDURE — 87186 SC STD MICRODIL/AGAR DIL: CPT

## 2024-09-21 PROCEDURE — 83036 HEMOGLOBIN GLYCOSYLATED A1C: CPT

## 2024-09-21 PROCEDURE — 87086 URINE CULTURE/COLONY COUNT: CPT

## 2024-09-21 PROCEDURE — 81001 URINALYSIS AUTO W/SCOPE: CPT

## 2024-09-21 RX ORDER — NITROFURANTOIN 25; 75 MG/1; MG/1
100 CAPSULE ORAL 2 TIMES DAILY
Qty: 10 CAPSULE | Refills: 0 | Status: SHIPPED | OUTPATIENT
Start: 2024-09-21 | End: 2024-09-26

## 2024-09-22 LAB — BACTERIA UR CULT: ABNORMAL

## 2024-11-27 DIAGNOSIS — F32.2 CURRENT SEVERE EPISODE OF MAJOR DEPRESSIVE DISORDER WITHOUT PSYCHOTIC FEATURES WITHOUT PRIOR EPISODE (H): ICD-10-CM

## 2024-11-27 RX ORDER — DULOXETIN HYDROCHLORIDE 30 MG/1
CAPSULE, DELAYED RELEASE ORAL
Qty: 60 CAPSULE | Refills: 0 | Status: SHIPPED | OUTPATIENT
Start: 2024-11-27

## 2025-01-04 DIAGNOSIS — F32.2 CURRENT SEVERE EPISODE OF MAJOR DEPRESSIVE DISORDER WITHOUT PSYCHOTIC FEATURES WITHOUT PRIOR EPISODE (H): ICD-10-CM

## 2025-01-06 RX ORDER — DULOXETIN HYDROCHLORIDE 30 MG/1
30 CAPSULE, DELAYED RELEASE ORAL 2 TIMES DAILY
Qty: 180 CAPSULE | Refills: 1 | Status: SHIPPED | OUTPATIENT
Start: 2025-01-06

## 2025-03-22 ENCOUNTER — HEALTH MAINTENANCE LETTER (OUTPATIENT)
Age: 75
End: 2025-03-22

## 2025-04-08 DIAGNOSIS — I10 ESSENTIAL HYPERTENSION: ICD-10-CM

## 2025-04-08 RX ORDER — LISINOPRIL AND HYDROCHLOROTHIAZIDE 12.5; 2 MG/1; MG/1
1 TABLET ORAL DAILY
Qty: 90 TABLET | Refills: 0 | OUTPATIENT
Start: 2025-04-08

## 2025-05-21 ENCOUNTER — PATIENT OUTREACH (OUTPATIENT)
Dept: CARE COORDINATION | Facility: CLINIC | Age: 75
End: 2025-05-21

## 2025-06-04 ENCOUNTER — PATIENT OUTREACH (OUTPATIENT)
Dept: CARE COORDINATION | Facility: CLINIC | Age: 75
End: 2025-06-04

## 2025-06-30 DIAGNOSIS — F32.2 CURRENT SEVERE EPISODE OF MAJOR DEPRESSIVE DISORDER WITHOUT PSYCHOTIC FEATURES WITHOUT PRIOR EPISODE (H): ICD-10-CM

## 2025-06-30 RX ORDER — DULOXETIN HYDROCHLORIDE 30 MG/1
30 CAPSULE, DELAYED RELEASE ORAL 2 TIMES DAILY
Qty: 180 CAPSULE | Refills: 0 | Status: SHIPPED | OUTPATIENT
Start: 2025-06-30

## 2025-07-12 DIAGNOSIS — I10 ESSENTIAL HYPERTENSION: ICD-10-CM

## 2025-07-14 RX ORDER — LISINOPRIL AND HYDROCHLOROTHIAZIDE 12.5; 2 MG/1; MG/1
1 TABLET ORAL DAILY
Qty: 90 TABLET | Refills: 0 | OUTPATIENT
Start: 2025-07-14

## 2025-07-20 DIAGNOSIS — E11.9 TYPE 2 DIABETES MELLITUS WITHOUT COMPLICATION, WITHOUT LONG-TERM CURRENT USE OF INSULIN (H): ICD-10-CM

## 2025-08-22 ENCOUNTER — MEDICAL CORRESPONDENCE (OUTPATIENT)
Dept: HEALTH INFORMATION MANAGEMENT | Facility: CLINIC | Age: 75
End: 2025-08-22
Payer: COMMERCIAL